# Patient Record
Sex: FEMALE | Race: BLACK OR AFRICAN AMERICAN | Employment: FULL TIME | URBAN - METROPOLITAN AREA
[De-identification: names, ages, dates, MRNs, and addresses within clinical notes are randomized per-mention and may not be internally consistent; named-entity substitution may affect disease eponyms.]

---

## 2017-11-13 ENCOUNTER — OFFICE VISIT (OUTPATIENT)
Dept: INTERNAL MEDICINE CLINIC | Age: 47
End: 2017-11-13

## 2017-11-13 VITALS
RESPIRATION RATE: 18 BRPM | BODY MASS INDEX: 35.01 KG/M2 | HEART RATE: 76 BPM | WEIGHT: 231 LBS | DIASTOLIC BLOOD PRESSURE: 94 MMHG | TEMPERATURE: 96.4 F | SYSTOLIC BLOOD PRESSURE: 145 MMHG | OXYGEN SATURATION: 100 % | HEIGHT: 68 IN

## 2017-11-13 DIAGNOSIS — R05.9 COUGH: ICD-10-CM

## 2017-11-13 DIAGNOSIS — J01.10 ACUTE NON-RECURRENT FRONTAL SINUSITIS: Primary | ICD-10-CM

## 2017-11-13 RX ORDER — ALBUTEROL SULFATE 90 UG/1
1 AEROSOL, METERED RESPIRATORY (INHALATION)
Qty: 1 INHALER | Refills: 0 | Status: SHIPPED | OUTPATIENT
Start: 2017-11-13 | End: 2017-11-20

## 2017-11-13 RX ORDER — BENZONATATE 100 MG/1
100 CAPSULE ORAL
Qty: 21 CAP | Refills: 0 | Status: SHIPPED | OUTPATIENT
Start: 2017-11-13 | End: 2017-11-20

## 2017-11-13 RX ORDER — AMOXICILLIN AND CLAVULANATE POTASSIUM 600; 42.9 MG/5ML; MG/5ML
7.29 POWDER, FOR SUSPENSION ORAL 2 TIMES DAILY
Qty: 150 ML | Refills: 0 | Status: SHIPPED | OUTPATIENT
Start: 2017-11-13 | End: 2017-11-23

## 2017-11-13 NOTE — PATIENT INSTRUCTIONS
Cough: Care Instructions  Your Care Instructions    A cough is your body's response to something that bothers your throat or airways. Many things can cause a cough. You might cough because of a cold or the flu, bronchitis, or asthma. Smoking, postnasal drip, allergies, and stomach acid that backs up into your throat also can cause coughs. A cough is a symptom, not a disease. Most coughs stop when the cause, such as a cold, goes away. You can take a few steps at home to cough less and feel better. Follow-up care is a key part of your treatment and safety. Be sure to make and go to all appointments, and call your doctor if you are having problems. It's also a good idea to know your test results and keep a list of the medicines you take. How can you care for yourself at home? · Drink lots of water and other fluids. This helps thin the mucus and soothes a dry or sore throat. Honey or lemon juice in hot water or tea may ease a dry cough. · Take cough medicine as directed by your doctor. · Prop up your head on pillows to help you breathe and ease a dry cough. · Try cough drops to soothe a dry or sore throat. Cough drops don't stop a cough. Medicine-flavored cough drops are no better than candy-flavored drops or hard candy. · Do not smoke. Avoid secondhand smoke. If you need help quitting, talk to your doctor about stop-smoking programs and medicines. These can increase your chances of quitting for good. When should you call for help? Call 911 anytime you think you may need emergency care. For example, call if:  ? · You have severe trouble breathing. ?Call your doctor now or seek immediate medical care if:  ? · You cough up blood. ? · You have new or worse trouble breathing. ? · You have a new or higher fever. ? · You have a new rash. ? Watch closely for changes in your health, and be sure to contact your doctor if:  ? · You cough more deeply or more often, especially if you notice more mucus or a change in the color of your mucus. ? · You have new symptoms, such as a sore throat, an earache, or sinus pain. ? · You do not get better as expected. Where can you learn more? Go to http://bertha-carmelita.info/. Enter D279 in the search box to learn more about \"Cough: Care Instructions. \"  Current as of: May 12, 2017  Content Version: 11.4  © 4441-8394 [employer]. Care instructions adapted under license by [employer] (which disclaims liability or warranty for this information). If you have questions about a medical condition or this instruction, always ask your healthcare professional. [address] any warranty or liability for your use of this information. Sinusitis: Care Instructions  Your Care Instructions    Sinusitis is an infection of the lining of the sinus cavities in your head. Sinusitis often follows a cold. It causes pain and pressure in your head and face. In most cases, sinusitis gets better on its own in 1 to 2 weeks. But some mild symptoms may last for several weeks. Sometimes antibiotics are needed. Follow-up care is a key part of your treatment and safety. Be sure to make and go to all appointments, and call your doctor if you are having problems. It's also a good idea to know your test results and keep a list of the medicines you take. How can you care for yourself at home? · Take an over-the-counter pain medicine, such as acetaminophen (Tylenol), ibuprofen (Advil, Motrin), or naproxen (Aleve). Read and follow all instructions on the label. · If the doctor prescribed antibiotics, take them as directed. Do not stop taking them just because you feel better. You need to take the full course of antibiotics. · Be careful when taking over-the-counter cold or flu medicines and Tylenol at the same time. Many of these medicines have acetaminophen, which is Tylenol.  Read the labels to make sure that you are not taking more than the recommended dose. Too much acetaminophen (Tylenol) can be harmful. · Breathe warm, moist air from a steamy shower, a hot bath, or a sink filled with hot water. Avoid cold, dry air. Using a humidifier in your home may help. Follow the directions for cleaning the machine. · Use saline (saltwater) nasal washes to help keep your nasal passages open and wash out mucus and bacteria. You can buy saline nose drops at a grocery store or drugstore. Or you can make your own at home by adding 1 teaspoon of salt and 1 teaspoon of baking soda to 2 cups of distilled water. If you make your own, fill a bulb syringe with the solution, insert the tip into your nostril, and squeeze gently. Meliton Maizes your nose. · Put a hot, wet towel or a warm gel pack on your face 3 or 4 times a day for 5 to 10 minutes each time. · Try a decongestant nasal spray like oxymetazoline (Afrin). Do not use it for more than 3 days in a row. Using it for more than 3 days can make your congestion worse. When should you call for help? Call your doctor now or seek immediate medical care if:  ? · You have new or worse swelling or redness in your face or around your eyes. ? · You have a new or higher fever. ? Watch closely for changes in your health, and be sure to contact your doctor if:  ? · You have new or worse facial pain. ? · The mucus from your nose becomes thicker (like pus) or has new blood in it. ? · You are not getting better as expected. Where can you learn more? Go to http://bertha-carmelita.info/. Enter I991 in the search box to learn more about \"Sinusitis: Care Instructions. \"  Current as of: May 12, 2017  Content Version: 11.4  © 8363-9717 [employer]. Care instructions adapted under license by [employer] (which disclaims liability or warranty for this information).  If you have questions about a medical condition or this instruction, always ask your healthcare professional. [employer] disclaims any warranty or liability for your use of this information.

## 2017-11-13 NOTE — LETTER
NOTIFICATION RETURN TO WORK / SCHOOL 
 
11/13/2017 9:48 AM 
 
Ms. Gage Lindsey [facility] [phone] To Whom It May Concern: 
 
Gage Lindsey is currently under the care of Juan José Navarro. She will return to work/school on: 11/16/2017. If there are questions or concerns please have the patient contact our office. Sincerely, Thomas Melendrez NP

## 2017-11-13 NOTE — PROGRESS NOTES
ROOM # 8    Rosalinda Washington presents today for   Chief Complaint   Patient presents with    Cold Symptoms     X 2 WEEKS     Vomiting       Rosalinda Washington preferred language for health care discussion is english/other. Is someone accompanying this pt?no    Is the patient using any DME equipment during OV? no    Depression Screening:  PHQ over the last two weeks 11/18/2014   Little interest or pleasure in doing things Not at all   Feeling down, depressed or hopeless Not at all   Total Score PHQ 2 0       Learning Assessment:  Learning Assessment 11/13/2017   PRIMARY LEARNER Patient   HIGHEST LEVEL OF EDUCATION - PRIMARY LEARNER  2 YEARS OF COLLEGE   BARRIERS PRIMARY LEARNER NONE   CO-LEARNER CAREGIVER No   PRIMARY LANGUAGE ENGLISH   LEARNER PREFERENCE PRIMARY READING     DEMONSTRATION     VIDEOS   ANSWERED BY PATIENT   RELATIONSHIP SELF       Abuse Screening:  Abuse Screening Questionnaire 11/13/2017   Do you ever feel afraid of your partner? N   Are you in a relationship with someone who physically or mentally threatens you? N   Is it safe for you to go home? Y       Fall Risk  n/i    Health Maintenance reviewed and discussed per provider. Yes    Rosalinda Washington is due for flu injection . Please order/place referral if appropriate. Advance Directive:  1. Do you have an advance directive in place? Patient Reply: no    2. If not, would you like material regarding how to put one in place? Patient Reply: no    Coordination of Care:  1. Have you been to the ER, urgent care clinic since your last visit? Hospitalized since your last visit? no    2. Have you seen or consulted any other health care providers outside of the [address] [place] [address] since your last visit? Include any pap smears or colon screening.  no

## 2017-11-13 NOTE — MR AVS SNAPSHOT
Visit Information Date & Time Provider Department Dept. Phone Encounter #  
 11/13/2017  9:30 AM Kate Coelho NP [facility] [phone] [id number] Upcoming Health Maintenance Date Due DTaP/Tdap/Td series (1 - Tdap) 11/21/1990 PAP AKA CERVICAL CYTOLOGY 11/21/1990 Allergies as of 11/13/2017  Review Complete On: 11/13/2017 By: Faye Bustillo No Known Allergies Current Immunizations  Never Reviewed No immunizations on file. Not reviewed this visit You Were Diagnosed With   
  
 Codes Comments Acute non-recurrent frontal sinusitis    -  Primary ICD-10-CM: J01.10 ICD-9-CM: 989.5 Cough     ICD-10-CM: R05 ICD-9-CM: 066. 2 Vitals BP Pulse Temp Resp Height(growth percentile) Weight(growth percentile) (!) 145/94 (BP 1 Location: Right arm, BP Patient Position: Sitting) 76 96.4 °F (35.8 °C) (Oral) 18 5' 8\" (1.727 m) 231 lb (104.8 kg) SpO2 BMI OB Status Smoking Status 100% 35.12 kg/m2 Having regular periods Never Smoker Vitals History BMI and BSA Data Body Mass Index Body Surface Area  
 35.12 kg/m 2 2.24 m 2 Preferred Pharmacy Pharmacy Name Phone [id number] [address] [phone] Your Updated Medication List  
  
   
This list is accurate as of: 11/13/17  9:49 AM.  Always use your most recent med list.  
  
  
  
  
 albuterol 90 mcg/actuation inhaler Commonly known as:  PROVENTIL HFA, VENTOLIN HFA, PROAIR HFA Take 1 Puff by inhalation every six (6) hours as needed for Wheezing for up to 7 days. amLODIPine-Olmesartan 10-40 mg Tab Commonly known as:  KAMALA Take 10-40 mg by mouth daily. Indications: Hypertension  
  
 amoxicillin-clavulanate 600-42.9 mg/5 mL suspension Commonly known as:  AUGMENTIN Take 7.5 mL by mouth two (2) times a day for 10 days. benzonatate 100 mg capsule Commonly known as:  TESSALON  
 Take 1 Cap by mouth three (3) times daily as needed for Cough for up to 7 days. hydroCHLOROthiazide 25 mg tablet Commonly known as:  HYDRODIURIL Take 1 tablet by mouth daily. oxyCODONE-acetaminophen 5-325 mg per tablet Commonly known as:  PERCOCET Take 1 Tab by Mouth Every 6 Hours As Needed. Do not exceed 4000 mg of acetaminophen per day. Prescriptions Sent to Pharmacy Refills  
 amoxicillin-clavulanate (AUGMENTIN) 600-42.9 mg/5 mL suspension 0 Sig: Take 7.5 mL by mouth two (2) times a day for 10 days. Class: Normal  
 Pharmacy: [address] [place] [address] Ph #: [phone] Route: Oral  
 benzonatate (TESSALON) 100 mg capsule 0 Sig: Take 1 Cap by mouth three (3) times daily as needed for Cough for up to 7 days. Class: Normal  
 Pharmacy: [address] [place] [address] Ph #: [phone] Route: Oral  
 albuterol (PROVENTIL HFA, VENTOLIN HFA, PROAIR HFA) 90 mcg/actuation inhaler 0 Sig: Take 1 Puff by inhalation every six (6) hours as needed for Wheezing for up to 7 days. Class: Normal  
 Pharmacy: [address] [place] [address] Ph #: [phone] Route: Inhalation Patient Instructions Cough: Care Instructions Your Care Instructions A cough is your body's response to something that bothers your throat or airways. Many things can cause a cough. You might cough because of a cold or the flu, bronchitis, or asthma. Smoking, postnasal drip, allergies, and stomach acid that backs up into your throat also can cause coughs. A cough is a symptom, not a disease. Most coughs stop when the cause, such as a cold, goes away. You can take a few steps at home to cough less and feel better. Follow-up care is a key part of your treatment and safety.  Be sure to make and go to all appointments, and call your doctor if you are having problems. It's also a good idea to know your test results and keep a list of the medicines you take. How can you care for yourself at home? · Drink lots of water and other fluids. This helps thin the mucus and soothes a dry or sore throat. Honey or lemon juice in hot water or tea may ease a dry cough. · Take cough medicine as directed by your doctor. · Prop up your head on pillows to help you breathe and ease a dry cough. · Try cough drops to soothe a dry or sore throat. Cough drops don't stop a cough. Medicine-flavored cough drops are no better than candy-flavored drops or hard candy. · Do not smoke. Avoid secondhand smoke. If you need help quitting, talk to your doctor about stop-smoking programs and medicines. These can increase your chances of quitting for good. When should you call for help? Call 911 anytime you think you may need emergency care. For example, call if: 
? · You have severe trouble breathing. ?Call your doctor now or seek immediate medical care if: 
? · You cough up blood. ? · You have new or worse trouble breathing. ? · You have a new or higher fever. ? · You have a new rash. ? Watch closely for changes in your health, and be sure to contact your doctor if: 
? · You cough more deeply or more often, especially if you notice more mucus or a change in the color of your mucus. ? · You have new symptoms, such as a sore throat, an earache, or sinus pain. ? · You do not get better as expected. Where can you learn more? Go to http://bertha-carmelita.info/. Enter D279 in the search box to learn more about \"Cough: Care Instructions. \" Current as of: May 12, 2017 Content Version: 11.4 © 1267-9709 [employer]. Care instructions adapted under license by [email or web address] (which disclaims liability or warranty for this information).  If you have questions about a medical condition or this instruction, always ask your healthcare professional. [place] [address] any warranty or liability for your use of this information. Sinusitis: Care Instructions Your Care Instructions Sinusitis is an infection of the lining of the sinus cavities in your head. Sinusitis often follows a cold. It causes pain and pressure in your head and face. In most cases, sinusitis gets better on its own in 1 to 2 weeks. But some mild symptoms may last for several weeks. Sometimes antibiotics are needed. Follow-up care is a key part of your treatment and safety. Be sure to make and go to all appointments, and call your doctor if you are having problems. It's also a good idea to know your test results and keep a list of the medicines you take. How can you care for yourself at home? · Take an over-the-counter pain medicine, such as acetaminophen (Tylenol), ibuprofen (Advil, Motrin), or naproxen (Aleve). Read and follow all instructions on the label. · If the doctor prescribed antibiotics, take them as directed. Do not stop taking them just because you feel better. You need to take the full course of antibiotics. · Be careful when taking over-the-counter cold or flu medicines and Tylenol at the same time. Many of these medicines have acetaminophen, which is Tylenol. Read the labels to make sure that you are not taking more than the recommended dose. Too much acetaminophen (Tylenol) can be harmful. · Breathe warm, moist air from a steamy shower, a hot bath, or a sink filled with hot water. Avoid cold, dry air. Using a humidifier in your home may help. Follow the directions for cleaning the machine. · Use saline (saltwater) nasal washes to help keep your nasal passages open and wash out mucus and bacteria. You can buy saline nose drops at a grocery store or drugstore.  Or you can make your own at home by adding 1 teaspoon of salt and 1 teaspoon of baking soda to 2 cups of distilled water. If you make your own, fill a bulb syringe with the solution, insert the tip into your nostril, and squeeze gently. Afia Bunting your nose. · Put a hot, wet towel or a warm gel pack on your face 3 or 4 times a day for 5 to 10 minutes each time. · Try a decongestant nasal spray like oxymetazoline (Afrin). Do not use it for more than 3 days in a row. Using it for more than 3 days can make your congestion worse. When should you call for help? Call your doctor now or seek immediate medical care if: 
? · You have new or worse swelling or redness in your face or around your eyes. ? · You have a new or higher fever. ? Watch closely for changes in your health, and be sure to contact your doctor if: 
? · You have new or worse facial pain. ? · The mucus from your nose becomes thicker (like pus) or has new blood in it. ? · You are not getting better as expected. Where can you learn more? Go to http://bertha-carmelita.info/. Enter H051 in the search box to learn more about \"Sinusitis: Care Instructions. \" Current as of: May 12, 2017 Content Version: 11.4 © 0120-7355 amaysim. Care instructions adapted under license by [employer] (which disclaims liability or warranty for this information). If you have questions about a medical condition or this instruction, always ask your healthcare professional. [place] [address] any warranty or liability for your use of this information. Introducing [facility]! Melissa Hudson introduces "[employer] patient portal. Now you can access parts of your medical record, email your doctor's office, and request medication refills online. 1. In your internet browser, go to [email or web address] [email or web address]. [email or web address]/[employer] 2. Click on the First Time User? Click Here link in the Sign In box. You will see the New Member Sign Up page. 3. Enter your "[employer] Access Code exactly as it appears below.  You will not need to use this code after youve completed the sign-up process. If you do not sign up before the expiration date, you must request a new code. · Nepris Access Code: [email or web address] Expires: 2/11/2018  9:49 AM 
 
4. Enter the last four digits of your Social Security Number (xxxx) and Date of Birth (mm/dd/yyyy) as indicated and click Submit. You will be taken to the next sign-up page. 5. Create a Nepris ID. This will be your Nepris login ID and cannot be changed, so think of one that is secure and easy to remember. 6. Create a Nepris password. You can change your password at any time. 7. Enter your Password Reset Question and Answer. This can be used at a later time if you forget your password. 8. Enter your e-mail address. You will receive e-mail notification when new information is available in [address]. 9. Click Sign Up. You can now view and download portions of your medical record. 10. Click the Download Summary menu link to download a portable copy of your medical information. If you have questions, please visit the Frequently Asked Questions section of the Nepris website. Remember, Nepris is NOT to be used for urgent needs. For medical emergencies, dial 911. Now available from your iPhone and Android! Please provide this summary of care documentation to your next provider. If you have any questions after today's visit, please call [phone].

## 2017-11-13 NOTE — PROGRESS NOTES
HISTORY OF PRESENT ILLNESS  Kirk Bartholomew is a 51 y.o. female. Cold Symptoms   The history is provided by the patient. This is a new problem. The current episode started more than 2 days ago. The problem occurs constantly. The problem has been gradually worsening. The cough is productive of sputum. Patient reports a subjective fever - was not measured. The fever has been present for 3 - 4 days. Associated symptoms include chest pain, chills, headaches, rhinorrhea, sore throat, shortness of breath and wheezing. Pertinent negatives include no weight loss, no eye redness, no ear pain, no myalgias, no nausea, no vomiting and no confusion. She has tried cough syrup for the symptoms. The treatment provided no relief. She is not a smoker. Her past medical history does not include bronchitis or pneumonia. Review of Systems   Constitutional: Positive for chills, fever and malaise/fatigue. Negative for weight loss. HENT: Positive for congestion, rhinorrhea, sinus pain and sore throat. Negative for ear pain. Eyes: Negative for blurred vision, double vision, photophobia and redness. Respiratory: Positive for cough, sputum production, shortness of breath and wheezing. Cardiovascular: Positive for chest pain. Negative for palpitations and orthopnea. Gastrointestinal: Negative for abdominal pain, heartburn, nausea and vomiting. Genitourinary: Negative for dysuria, frequency and urgency. Musculoskeletal: Negative for myalgias. Skin: Negative for rash. Neurological: Positive for headaches. Negative for dizziness. Endo/Heme/Allergies: Negative for environmental allergies and polydipsia. Psychiatric/Behavioral: Negative for confusion and depression. The patient is not nervous/anxious. Physical Exam   Constitutional: She is oriented to person, place, and time. She appears well-developed and well-nourished. No distress. HENT:   Head: Normocephalic and atraumatic.    Right Ear: External ear normal. Tympanic membrane is erythematous. Left Ear: External ear normal. Tympanic membrane is erythematous. Nose: No mucosal edema or rhinorrhea. Mouth/Throat: Mucous membranes are normal. Oropharyngeal exudate, posterior oropharyngeal edema and posterior oropharyngeal erythema present. No tonsillar abscesses. Eyes: EOM are normal. Pupils are equal, round, and reactive to light. Neck: Normal range of motion. Neck supple. Cardiovascular: Regular rhythm. Pulmonary/Chest: Breath sounds normal. She has no wheezes. Lymphadenopathy:     She has no cervical adenopathy. Neurological: She is alert and oriented to person, place, and time. No cranial nerve deficit. Skin: Skin is warm and dry. She is not diaphoretic. No erythema. Psychiatric: She has a normal mood and affect. Nursing note and vitals reviewed. ASSESSMENT and PLAN    ICD-10-CM ICD-9-CM    1. Acute non-recurrent frontal sinusitis J01.10 461.1 amoxicillin-clavulanate (AUGMENTIN) 600-42.9 mg/5 mL suspension   2. Cough R05 786.2 amoxicillin-clavulanate (AUGMENTIN) 600-42.9 mg/5 mL suspension      benzonatate (TESSALON) 100 mg capsule      albuterol (PROVENTIL HFA, VENTOLIN HFA, PROAIR HFA) 90 mcg/actuation inhaler   Patient advised to take medication as prescribed. Take rest and plenty of fluids. Alternate tylenol and ibuprofen for fever. Return to clinic if condition worsens in next 72 hours. Patient verbalized the understanding.

## 2018-01-29 ENCOUNTER — OFFICE VISIT (OUTPATIENT)
Dept: ORTHOPEDIC SURGERY | Age: 48
End: 2018-01-29

## 2018-01-29 VITALS
HEART RATE: 84 BPM | BODY MASS INDEX: 34.86 KG/M2 | OXYGEN SATURATION: 100 % | RESPIRATION RATE: 16 BRPM | SYSTOLIC BLOOD PRESSURE: 159 MMHG | WEIGHT: 230 LBS | HEIGHT: 68 IN | DIASTOLIC BLOOD PRESSURE: 99 MMHG

## 2018-01-29 DIAGNOSIS — M76.821 TIBIALIS TENDINITIS OF BOTH LOWER EXTREMITIES: Primary | ICD-10-CM

## 2018-01-29 DIAGNOSIS — M77.50 FOOT TENDINITIS: ICD-10-CM

## 2018-01-29 DIAGNOSIS — M76.822 TIBIALIS TENDINITIS OF BOTH LOWER EXTREMITIES: Primary | ICD-10-CM

## 2018-01-29 DIAGNOSIS — M77.50 ANKLE TENDINITIS: ICD-10-CM

## 2018-01-29 RX ORDER — MELOXICAM 15 MG/1
TABLET ORAL
Qty: 90 TAB | Refills: 0 | Status: SHIPPED | OUTPATIENT
Start: 2018-01-29 | End: 2018-02-25

## 2018-01-29 NOTE — MR AVS SNAPSHOT
[address] [address] [address], [address] [facility] [id number] 
[phone] Patient: Britney Ford MRN: [id number] :1970 Visit Information Date & Time Provider Department Dept. Phone Encounter #  
 2018  8:20 AM Luis M Fatima, [address] [facility] [phone] [id number] Follow-up Instructions Return in about 4 weeks (around 2018) for ankle/foot tendinitis. Upcoming Health Maintenance Date Due DTaP/Tdap/Td series (1 - Tdap) 1990 PAP AKA CERVICAL CYTOLOGY 1990 Allergies as of 2018  Review Complete On: 2018 By: Luis M Fatima,  No Known Allergies Current Immunizations  Never Reviewed No immunizations on file. Not reviewed this visit You Were Diagnosed With   
  
 Codes Comments Anterior tibial tendonitis, unspecified laterality    -  Primary ICD-10-CM: P05.572 ICD-9-CM: 726.72 Tibialis posterior tendinitis, unspecified laterality     ICD-10-CM: S85.312 ICD-9-CM: 726.72 Foot tendinitis     ICD-10-CM: M77.50 ICD-9-CM: 727.06 Ankle tendinitis     ICD-10-CM: M77.50 ICD-9-CM: 727.06 Vitals BP Pulse Resp Height(growth percentile) Weight(growth percentile) SpO2  
 (!) 159/99 (BP 1 Location: Right arm, BP Patient Position: Sitting) 84 16 5' 8\" (1.727 m) 230 lb (104.3 kg) 100% BMI OB Status Smoking Status 34.97 kg/m2 Having regular periods Never Smoker BMI and BSA Data Body Mass Index Body Surface Area 34.97 kg/m 2 2.24 m 2 Preferred Pharmacy Pharmacy Name Phone [phone], [address] [place] [address] [phone] Your Updated Medication List  
  
   
This list is accurate as of: 18  8:46 AM.  Always use your most recent med list. amLODIPine-Olmesartan 10-40 mg Tab Commonly known as:  KAMALA  
 Take 10-40 mg by mouth daily. Indications: Hypertension  
  
 hydroCHLOROthiazide 25 mg tablet Commonly known as:  HYDRODIURIL Take 1 tablet by mouth daily. meloxicam 15 mg tablet Commonly known as:  MOBIC Take 1 tab daily as needed pain with food. oxyCODONE-acetaminophen 5-325 mg per tablet Commonly known as:  PERCOCET Take 1 Tab by Mouth Every 6 Hours As Needed. Do not exceed 4000 mg of acetaminophen per day. Prescriptions Sent to Pharmacy Refills  
 meloxicam (MOBIC) 15 mg tablet 0 Sig: Take 1 tab daily as needed pain with food. Class: Normal  
 Pharmacy: [address] [facility], [address] [facility]  #: [phone] We Performed the Following REFERRAL TO PHYSICAL THERAPY [AZT03 Custom] Comments:  
 2-3/week for 4 weeks. [phone] Follow-up Instructions Return in about 4 weeks (around 2/26/2018) for ankle/foot tendinitis. Referral Information Referral ID Referred By Referred To  
  
 [id number] [address], [address] [place] [address] Phone: [phone] Visits Status Start Date End Date 1 New Request 1/29/18 1/29/19 If your referral has a status of pending review or denied, additional information will be sent to support the outcome of this decision. Patient Instructions   
Michelle Search [employer] for my channel: 
 
Dr. Juwan Rosarioints Introducing [facility]! [facility] introduces [employer] patient portal. Now you can access parts of your medical record, email your doctor's office, and request medication refills online. 1. In your internet browser, go to [email or web address] 2. Click on the First Time User? Click Here link in the Sign In box. You will see the New Member Sign Up page. 3. Enter your Zipzoom Access Code exactly as it appears below. You will not need to use this code after youve completed the sign-up process. If you do not sign up before the expiration date, you must request a new code. · Zipzoom Access Code: [email or web address] Expires: 2/11/2018  9:49 AM 
 
4. Enter the last four digits of your Social Security Number (xxxx) and Date of Birth (mm/dd/yyyy) as indicated and click Submit. You will be taken to the next sign-up page. 5. Create a Zipzoom ID. This will be your Zipzoom login ID and cannot be changed, so think of one that is secure and easy to remember. 6. Create a Zipzoom password. You can change your password at any time. 7. Enter your Password Reset Question and Answer. This can be used at a later time if you forget your password. 8. Enter your e-mail address. You will receive e-mail notification when new information is available in [address] 9. Click Sign Up. You can now view and download portions of your medical record. 10. Click the Download Summary menu link to download a portable copy of your medical information. If you have questions, please visit the Frequently Asked Questions section of the Zipzoom website. Remember, Zipzoom is NOT to be used for urgent needs. For medical emergencies, dial 911. Now available from your iPhone and Android! Please provide this summary of care documentation to your next provider. If you have any questions after today's visit, please call [phone].

## 2018-01-29 NOTE — PROGRESS NOTES
HISTORY OF PRESENT ILLNESS    Steve Cartwright is a 49y.o. year old female comes in today as new patient for: foot pain bilateral    Patients symptoms have been present for 1 years. Pain level 8/10 bilateral feet, It has worsened with walking. Patient has tried:  massgae and different shoes w/o benefit. It is described as pain great toe and medial foot worse with waling and no injury. Will swell sometimes but not today. Social History     Social History    Marital status:      Spouse name: N/A    Number of children: N/A    Years of education: N/A     Social History Main Topics    Smoking status: Never Smoker    Smokeless tobacco: Never Used    Alcohol use No    Drug use: No    Sexual activity: Yes     Partners: Male     Other Topics Concern    None     Social History Narrative     Current Outpatient Prescriptions   Medication Sig Dispense Refill    amLODIPine-Olmesartan (KAMALA) 10-40 mg tab Take 10-40 mg by mouth daily. Indications: Hypertension 90 Tab 3    oxyCODONE-acetaminophen (PERCOCET) 5-325 mg per tablet Take 1 Tab by Mouth Every 6 Hours As Needed. Do not exceed 4000 mg of acetaminophen per day.  hydrochlorothiazide (HYDRODIURIL) 25 mg tablet Take 1 tablet by mouth daily. (Patient not taking: Reported on 1/29/2018) 60 tablet 5     History reviewed. No pertinent past medical history. Family History   Problem Relation Age of Onset    Dementia Father          ROS  :All other systems reviewed and negative aside from that written in the HPI. Objective:  Visit Vitals    BP (!) 159/99 (BP 1 Location: Right arm, BP Patient Position: Sitting)  Comment: hasn't taken am dose of BP med    Pulse 84    Resp 16    Ht 5' 8\" (1.727 m)    Wt 230 lb (104.3 kg)    SpO2 100%    BMI 34.97 kg/m2     GEN: Appears stated age in NAD. HEAD:  Normocephalic, atraumatic. NEURO:  Sensation intact light touch B/L lower extremities.   MS:  Strength normal throughout upper and lower extremities bilateral . bilateral  ankle/foot:  Positive tenderness at tib ant/post/EHL. Negative for tenderness at malleoli, base 5th. Anterior drawer test negative. Talar tilt negative. Kleiger test negative. Syndesmosis squeeze negative. negative fibular head tenderness. Fibular head motion normal. Gait normal.  no clubbing/cyanosis. ROM normal.  EXT: no clubbing/cyanosis. no edema. SKIN: Warm/dry without rash. HEENT: Conjunctiva/lids WNL. External canals/nares WNL. Tongue midline. PERRL, EOMI. Hearing intact. NECK: Trachea midline. Supple, Full ROM. No thyromegaly. CARDIAC: No edema. LUNGS: Normal effort. ABD: Soft, no masses. No HSM. PSYCH: A+O x3. Appropriate judgment and insight. Assessment/Plan:   Encounter Diagnoses   Name Primary?  Tibialis tendinitis of both lower extremities Yes    Foot tendinitis     Ankle tendinitis      Orders Placed This Encounter    REFERRAL TO PHYSICAL THERAPY     Referral Priority:   Routine     Referral Type:   PT/OT/ST     Referral Reason:   Specialty Services Required    meloxicam (MOBIC) 15 mg tablet     Sig: Take 1 tab daily as needed pain with food. Dispense:  90 Tab     Refill:  0     Patient verbalizes understanding of evaluation and plan. Will start HEP an PT and use mobic and RTC 4 weeks.

## 2018-02-13 ENCOUNTER — HOSPITAL ENCOUNTER (OUTPATIENT)
Dept: PHYSICAL THERAPY | Age: 48
Discharge: HOME | End: 2018-02-13
Payer: COMMERCIAL

## 2018-02-13 PROCEDURE — 97162 PT EVAL MOD COMPLEX 30 MIN: CPT

## 2018-02-13 PROCEDURE — 97110 THERAPEUTIC EXERCISES: CPT

## 2018-02-13 NOTE — PROGRESS NOTES
[facility] PHYSICAL THERAPY  [address] [place] [address] [address], [address] - Phone: [phone]  Fax: [phone] / [address] [address] PHYSICAL THERAPY SERVICES  Patient Name: Owen Blake : 1970   Medical   Diagnosis: Bilateral foot pain [M79.671, M79.672] Treatment Diagnosis: Bilateral foot pain   Onset Date: Chronic (2 years)     Referral Source: Destini Arndt DO Start of Care [facility]): 2018   Prior Hospitalization: See medical history Provider #: [id number]   Prior Level of Function: Independent with ambulation, walking >3x/wk   Comorbidities: Anxiety, Arthritis, LBP, GI disease, HTN, removal of pituitary tumor   Medications: Verified on Patient Summary List   The Plan of Care and following information is based on the information from the initial evaluation.   ===========================================================================================  Assessment / key information:  Pt is a 49year old female who presents to PT today with bilateral foot pain consistent with flexor hallucis longus tendinopathy with sesmoiditis, and OA of the first MTP joint based on subjective and objective findings. Pt reports that her pain is insidious in nature and began 2 years ago. She states that she has a history of heel spurs that no longer bother her, indicating potential for arthritic changes of the first MTP joints bilaterally. She reports significant discomfort while walking in any style of footwear. Pt is TTP of bilateral hallux sesmoids, 1st MTP joint line, and both dorsal and plantar surfaces of the head of the first MTP. Foot posture is pes planus, and she demonstrates increased pronation bilaterally with bilateral IR of tibia and femur during gait assessment resulting in genu valgum.  She will benefit from OP PT to address the following impairments: decreased ankle and great toe A/PROM, decreased LE strength, decreased LE flexibility, decreased foot intrinsic strength, and functional limitations. Thank you for this referral.    Objective measures: Ankle/Foot A/PROM  R   L   Ankle DF:  Lack 10/lack 2 deg Lacking 3/5 deg   Ankle PF:  10-60   3-60   Ankle IV:  0-21/27  0-20/25   Ankle EV:  0-25/27  0-21/25   GT Ext:  0-55/60 p!  0-54/60 p! Strength:   R  L   Hip abd   3+/5  3-/5   Hip ext   3-/5  3-/5   Ankle DF   5/5  5/5   Ankle PF   Defer  Defer   Ankle IV   5/5  4/5   Ankle EV   5/5  5/5   Great Toe DF   3+/5 p!  3+/5 p! Great Toe PF  3+/5 p!  3+/5 p!   Special Tests:   Midfoot mobility: WNL   First ray position/mobility: Plantarflexed and hypomobile bilaterally    Pes planus    ==========================================================================================  Eval Complexity: History: HIGH Complexity :3+ comorbidities / personal factors will impact the outcome/ POC Exam:HIGH Complexity : 4+ Standardized tests and measures addressing body structure, function, activity limitation and / or participation in recreation  Presentation: MEDIUM Complexity : Evolving with changing characteristics  Clinical Decision Making:MEDIUM Complexity : FOTO score of 26-74Overall Complexity:MEDIUM    Problem List: pain affecting function, decrease ROM, decrease strength, impaired gait/ balance, decrease ADL/ functional abilitiies, decrease activity tolerance, decrease flexibility/ joint mobility and decrease transfer abilities   Treatment Plan may include any combination of the following: Therapeutic exercise, Therapeutic activities, Neuromuscular re-education, Physical agent/modality, Gait/balance training, Manual therapy, Aquatic therapy, Patient education, Self Care training, Functional mobility training, Home safety training and Stair training  Patient / Family readiness to learn indicated by: asking questions, trying to perform skills and interest  Persons(s) to be included in education: patient (P)  Barriers to Learning/Limitations: None  Measures taken: A/PROM ankle/foot, MMT LE, foot mobility assessment, abnormalities of gait. Patient Goal (s): \"I want to be able to walk without pain. \"   Patient self reported health status: good  Rehabilitation Potential: good   Short Term Goals: To be accomplished in  2  weeks:  1. Pt will be compliant with HEP for symptom management at home. 2. Pt will report reduced pain level to at least 5/10 in order to facilitate full participation in physical therapy activities. 3. Pt will improve bilateral ankle DF PROM to at least 10 degrees in order to facilitate normal gait mechanics.  Long Term Goals: To be accomplished in  4  weeks:  1. Pt will be independent with HEP at D/C for self management. 2. Pt will demonstrate bilateral great toe extension PROM to at least 80 degrees in order to normalize push off.   3. Pt will improve bilateral hip abd MMT to at least 4/5 in order to facilitate improved efficiency with ambulation. 4. Pt will increase FS FOTO Score to >/= 63 to indicate a significant decrease in functional disability. Frequency / Duration:   Patient to be seen  1-2  times per week for 4  weeks:  Patient / Caregiver education and instruction: self care, activity modification and exercises  G-Codes (GP): NA  Therapist Signature: Sushant Messina PT Date: 4/82/7115   Certification Period: 2/13/18 to 5/12/18 Time: 5:27 PM   ===========================================================================================  I certify that the above Physical Therapy Services are being furnished while the patient is under my care. I agree with the treatment plan and certify that this therapy is necessary. Physician Signature:        Date:       Time:     Please sign and return to [employer] or you may fax the signed copy to [phone]. Thank you.

## 2018-02-13 NOTE — PROGRESS NOTES
PHYSICAL THERAPY - DAILY TREATMENT NOTE    Patient Name: Jozef Estrada        Date: 2018  : 1970   YES Patient  Verified  Visit #:   1     Insurance: Payor: Mei Delcid / Plan: Winnie Fajardo PPO / Product Type: PPO /      In time: 4:05 Out time: 5:00   Total Treatment Time: 55 min     Medicare Time Tracking (below)   Total Timed Codes (min):  NA 1:1 Treatment Time:  NA     TREATMENT AREA =  Bilateral Foot Pain    SUBJECTIVE    Pain Level (on 0 to 10 scale):  8  / 10   Medication Changes/New allergies or changes in medical history, any new surgeries or procedures? NO    If yes, update Summary List   Subjective Functional Status/Changes:  []  No changes reported     See IE          OBJECTIVE    15 min Therapeutic Exercise:  [x]  See flow sheet   Rationale:      increase ROM, increase strength, improve coordination, improve balance and increase proprioception to improve the patients ability to participate in functional ADL's and negt within the community with ease. min Patient Education:  YES  Reviewed HEP   []  Progressed/Changed HEP based on:   Issued initial HEP     Other Objective/Functional Measures:    See IE. Post Treatment Pain Level (on 0 to 10) scale:   8  / 10     ASSESSMENT    Assessment/Changes in Function:     Justification for Eval Code Complexity: Moderate  Patient History (low 0, mod 1-2, high 3-4): Anxiety, Arthritis, BMI, Cholecystectomy, chronicity of symptoms.  High  Examination (low 1-2, mod 3+, high 4+): High   Clinical Presentation (low: stable/uncomplicated; mod: evolving; high: unstable/unpredictable): Evolving, Moderate  Clinical Decision Making (low , mod 26-74, high 1-25): FOTO: 54         []  See Progress Note/Recertification   Patient will continue to benefit from skilled PT services to analyze,, cue,, progress,, modify,, demonstrate,, instruct, and address, movement patterns,, therapeutic interventions,, postural abnormalities,, soft tissue restrictions,, ROM,, strength,, functional mobility,, body mechanics/ergonomics, and home and community integration, to attain remaining goals.    Progress toward goals / Updated goals:    See POC     PLAN    []  Upgrade activities as tolerated YES Continue plan of care   []  Discharge due to :    []  Other:      Therapist: PATRICIA Junior, SPT    Date: 2/13/2018 Time: 5:27 PM   Future Appointments  Date Time Provider Ely Coy   2/19/2018 10:00 AM Yamile Short, PT [facility]   2/21/2018 4:00 PM Alex Black PTA [facility]

## 2018-02-19 ENCOUNTER — HOSPITAL ENCOUNTER (OUTPATIENT)
Dept: PHYSICAL THERAPY | Age: 48
Discharge: HOME | End: 2018-02-19
Payer: COMMERCIAL

## 2018-02-19 PROCEDURE — 97110 THERAPEUTIC EXERCISES: CPT

## 2018-02-19 NOTE — PROGRESS NOTES
PHYSICAL THERAPY - DAILY TREATMENT NOTE    Patient Name: Nidhi Barajas        Date: 2018  : 1970   YES Patient  Verified  Visit #:   2     Insurance: Payor: Kevyn Gr / Plan: Janey Lam PPO / Product Type: PPO /      In time: 3:30 Out time: 4:10   Total Treatment Time: 40     Medicare Time Tracking (below)   Total Timed Codes (min):  NA 1:1 Treatment Time:  NA     TREATMENT AREA =  B feet    SUBJECTIVE    Pain Level (on 0 to 10 scale):  0  / 10   Medication Changes/New allergies or changes in medical history, any new surgeries or procedures? NO    If yes, update Summary List   Subjective Functional Status/Changes:  []  No changes reported     \"Today is a good day. I like the calf str exercise\"           OBJECTIVE    40 min Therapeutic Exercise:  [x]  See flow sheet   Rationale:      increase ROM and increase strength to improve the patients ability to amb and perform ADLs with with increased ease       min Patient Education:  YES  Reviewed HEP   []  Progressed/Changed HEP based on:   Cont HEP     Other Objective/Functional Measures: Added hip girdle strengthening with noted fatigue throughout   Poor ROM with seated HR, instructed pt to achieve \"flat front of ankle\" within pain free range     Post Treatment Pain Level (on 0 to 10) scale:   0  / 10     ASSESSMENT    Assessment/Changes in Function:     TOlerated session well, noted reports of calf tightness juan      []  See Progress Note/Recertification   Patient will continue to benefit from skilled PT services to analyze,, cue,, progress,, modify,, demonstrate,, instruct, and address, movement patterns,, therapeutic interventions,, postural abnormalities,, soft tissue restrictions,, ROM,, strength,, functional mobility,, body mechanics/ergonomics, and home and community integration, to attain remaining goals.    Progress toward goals / Updated goals:    Reports compliance with HEP     PLAN    []  Upgrade activities as tolerated YES Continue plan of care   []  Discharge due to :    []  Other:      Therapist: Izaiah Cosme DPT    Date: 2/19/2018 Time: 3:47 PM   Future Appointments  Date Time Provider Ely Coy   2/21/2018 4:00 PM Darius [facility]

## 2018-02-21 ENCOUNTER — HOSPITAL ENCOUNTER (OUTPATIENT)
Dept: PHYSICAL THERAPY | Age: 48
Discharge: HOME | End: 2018-02-21
Payer: COMMERCIAL

## 2018-02-21 PROCEDURE — 97110 THERAPEUTIC EXERCISES: CPT

## 2018-02-21 NOTE — PROGRESS NOTES
PHYSICAL THERAPY - DAILY TREATMENT NOTE    Patient Name: Claudette Guerra        Date: 2018  : 1970   yes Patient  Verified  Visit #:   3    Insurance: Payor: Scott Chapin / Plan: Susy Begun PPO / Product Type: PPO /      In time: 400 Out time: 455   Total Treatment Time: 55     TREATMENT AREA =  Bilateral foot pain [M79.671, M79.672]    SUBJECTIVE  Pain Level (on 0 to 10 scale):  8  / 10   Medication Changes/New allergies or changes in medical history, any new surgeries or procedures?    no  If yes, update Summary List   Subjective Functional Status/Changes:  []  No changes reported     \"I am hurting today, some days are better than others.  I been doing my HEP and it helps''          OBJECTIVE  Modalities Rationale:     decrease inflammation and decrease pain to improve patient's ability to  perform ADLs/prolong stding and amb/stairs with ease    min [] Estim, type/location:                                      []  att     []  unatt     []  w/US     []  w/ice    []  w/heat    min []  Mechanical Traction: type/lbs                   []  pro   []  sup   []  int   []  cont    []  before manual    []  after manual    min []  Ultrasound, settings/location:      min []  Iontophoresis w/ dexamethasone, location:                                               []  take home patch       []  in clinic   10 min [x]  Ice     []  Heat    location/position: Supine with wedge under B LEs  to dorsal and plantar    min []  Vasopneumatic Device, press/temp:     min []  Other:    [x] Skin assessment post-treatment (if applicable):    [x]  intact    []  redness- no adverse reaction     []redness - adverse reaction:        45 min Therapeutic Exercise:  [x]  See flow sheet   Rationale:      increase ROM, increase strength, improve coordination, improve balance and increase proprioception to improve the patients ability to perform ADLs/ bending/stooping/ lifting/prolong sitting, stding and amb/ stairs with ease min Patient Education:  yes  Reviewed HEP   []  Progressed/Changed HEP based on:  Pt ed on importance and benefits of compliance with HEP, core strength/stability and proper posture; pt verbalized understanding         Other Objective/Functional Measures:    VCs + demo to perform proper technique for TE    Initiated lacrox ball to plantar surface of Both feet, standing hip ext/abd without c/o p!    demos fair form bridge   Post Treatment Pain Level (on 0 to 10) scale:   0  / 10     ASSESSMENT  Assessment/Changes in Function:   c/o glut med fatigue with hip abd  Progressed there-ex without c/o increase p! []  See Progress Note/Recertification   Patient will continue to benefit from skilled PT services to modify and progress therapeutic interventions, address functional mobility deficits, address ROM deficits, address strength deficits, analyze and address soft tissue restrictions, analyze and cue movement patterns, analyze and modify body mechanics/ergonomics, assess and modify postural abnormalities and instruct in home and community integration to attain remaining goals. Progress toward goals / Updated goals: · Short Term Goals: To be accomplished in  2  weeks:  1. Pt will be compliant with HEP for symptom management at home. MET  2. Pt will report reduced pain level to at least 5/10 in order to facilitate full participation in physical therapy activities. progressing, intermittent pain free  3. Pt will improve bilateral ankle DF PROM to at least 10 degrees in order to facilitate normal gait mechanics.     · Long Term Goals: To be accomplished in  4  weeks:  1. Pt will be independent with HEP at D/C for self management. 2. Pt will demonstrate bilateral great toe extension PROM to at least 80 degrees in order to normalize push off.   3. Pt will improve bilateral hip abd MMT to at least 4/5 in order to facilitate improved efficiency with ambulation.    4. Pt will increase FS FOTO Score to >/= 63 to indicate a significant decrease in functional disability.       PLAN  []  Upgrade activities as tolerated yes Continue plan of care   []  Discharge due to :    []  Other:      Therapist: Annita Chase PTA    Date: 2/21/2018 Time: 3:51 PM     Future Appointments  Date Time Provider Ely Coy   2/21/2018 4:00 PM Annita Chase PTA [facility]

## 2018-03-07 ENCOUNTER — HOSPITAL ENCOUNTER (OUTPATIENT)
Dept: PHYSICAL THERAPY | Age: 48
Discharge: HOME | End: 2018-03-07
Payer: COMMERCIAL

## 2018-03-07 PROCEDURE — 97110 THERAPEUTIC EXERCISES: CPT

## 2018-03-07 PROCEDURE — 97140 MANUAL THERAPY 1/> REGIONS: CPT

## 2018-03-07 NOTE — PROGRESS NOTES
PHYSICAL THERAPY - DAILY TREATMENT NOTE    Patient Name: Claudette Guerra        Date: 3/7/2018  : 1970   YES Patient  Verified  Visit #:   4     Insurance: Payor: Scott Chapin / Plan: Susy Begun PPO / Product Type: PPO /      In time: 4:00 Out time: 4:55   Total Treatment Time: 55     Medicare Time Tracking (below)   Total Timed Codes (min):  NA 1:1 Treatment Time:  NA     TREATMENT AREA = Bilateral foot pain [M79.671, M79.672]    SUBJECTIVE    Pain Level (on 0 to 10 scale):  5  / 10   Medication Changes/New allergies or changes in medical history, any new surgeries or procedures? NO    If yes, update Summary List   Subjective Functional Status/Changes:  []  No changes reported     \"I am not getting any better. My  has to massage my feet every night because they hurt so bad       OBJECTIVE    Therapeutic Procedures:  Min Procedure Specifics + Rationale   n/a [x]  Patient Education (performed throughout session) [x] Review HEP    [] Progressed/Changed HEP based on:   [] proper performance and advancement of Therex/TA   [] reduction in pain level    [] increased functional capacity       [] change in directional preference   40 [x] Therapeutic Exercise   [x]  See Flowsheet   Rationale: increase ROM and increase strength to improve the patients ability to participate in ADL's    15 [x]  Manual Therapy       [x] taping     [] IASTM     [] Scar Massage  [] X-friction       [] PNF         [] PROM    [] TDN (see objective; actual needle insertion time not billed)   [] Soft tissue mobz   [] Joint mobz: Gr I [] II []  III [] IV[] V[]:  Plantar fascia taping to bilateral feet concentrating on arch support and downward pull on calcaneus. Rationale: decrease pain, increase ROM, increase tissue extensibility, decrease trigger points and increase postural awareness to attain functional use and participation with ADL's       Other Objective/Functional Measures:    PT updated HEP.  PT trailed taping along the plantar fascia for arch support and downward pull on calcaneus. PT noted decreased flexibility of gastroc and soleus complex bilaterally. See flow sheet for more details. Progressed therex per flow sheet. Post Treatment Pain Level (on 0 to 10) scale:   0  / 10     ASSESSMENT    Assessment/Changes in Function:     Pt educated pt on wearing high heels and suggesting flats or shoes that place the pt closer to neutral foot position. []  See Plan of Care  []  See Progress Note/ Recertification  []  See Discharge Summary        Patient will continue to benefit from skilled PT services to modify and progress therapeutic interventions, address functional mobility deficits, address ROM deficits, address strength deficits, analyze and address soft tissue restrictions, analyze and cue movement patterns, analyze and modify body mechanics/ergonomics, assess and modify postural abnormalities, address imbalance/dizziness and instruct in home and community integration  to attain remaining goals   Progress toward goals / Updated goals:  PT updated HEP. Pt reports compliance with HEP     PLAN    [x]  Upgrade activities as tolerated  [x]  Update interventions per flow sheet YES Continue plan of care   []  Discharge due to :    []  Other:      Therapist: Montana Kimbrough DPT    Date: 3/7/2018 Time: 4:15 PM   No future appointments.

## 2018-04-11 ENCOUNTER — OFFICE VISIT (OUTPATIENT)
Dept: INTERNAL MEDICINE CLINIC | Age: 48
End: 2018-04-11

## 2018-04-11 VITALS
DIASTOLIC BLOOD PRESSURE: 99 MMHG | SYSTOLIC BLOOD PRESSURE: 168 MMHG | OXYGEN SATURATION: 99 % | HEART RATE: 78 BPM | RESPIRATION RATE: 16 BRPM | BODY MASS INDEX: 35.01 KG/M2 | TEMPERATURE: 96.3 F | WEIGHT: 231 LBS | HEIGHT: 68 IN

## 2018-04-11 DIAGNOSIS — I10 HYPERTENSION, UNSPECIFIED TYPE: Primary | ICD-10-CM

## 2018-04-11 DIAGNOSIS — E01.0 THYROMEGALY: ICD-10-CM

## 2018-04-11 DIAGNOSIS — E66.9 OBESITY (BMI 30-39.9): ICD-10-CM

## 2018-04-11 RX ORDER — AMLODIPINE AND OLMESARTAN MEDOXOMIL 10; 40 MG/1; MG/1
10-40 TABLET ORAL DAILY
Qty: 90 TAB | Refills: 3 | Status: CANCELLED | OUTPATIENT
Start: 2018-04-11

## 2018-04-11 RX ORDER — LISINOPRIL AND HYDROCHLOROTHIAZIDE 20; 25 MG/1; MG/1
1 TABLET ORAL DAILY
Qty: 30 TAB | Refills: 0 | Status: SHIPPED | OUTPATIENT
Start: 2018-04-11 | End: 2018-05-16 | Stop reason: SDUPTHER

## 2018-04-11 NOTE — PROGRESS NOTES
FAMILY MEDICINE CLINIC NOTE    S: The patient presents for follow up on her hypertension. She has been taking amlodipine-olmesartan for several years. She states that she frequently has lower extremity swelling. She is amenable to a trial of another antihypertensive. She ran out of medication yesterday and thus did not take any today. Reports no angina, dyspnea, palpitations, temperature intolerance, polyuria or polydipsia    Current Outpatient Prescriptions on File Prior to Visit   Medication Sig Dispense Refill    amLODIPine-Olmesartan (KAMALA) 10-40 mg tab Take 10-40 mg by mouth daily. Indications: Hypertension 90 Tab 3    ibuprofen (MOTRIN) 600 mg tablet Take 1 Tab by mouth every six (6) hours as needed for Pain. 30 Tab 0    methocarbamol (ROBAXIN-750) 750 mg tablet Take 1 Tab by mouth every six (6) hours as needed. 40 Tab 0     No current facility-administered medications on file prior to visit. Past Medical History:   Diagnosis Date    Hypertension        Social History     Social History    Marital status:      Spouse name: N/A    Number of children: N/A    Years of education: N/A     Occupational History    Not on file. Social History Main Topics    Smoking status: Never Smoker    Smokeless tobacco: Never Used    Alcohol use No    Drug use: No    Sexual activity: Yes     Partners: Male     Other Topics Concern    Not on file     Social History Narrative       Family History   Problem Relation Age of Onset    Dementia Father          O:  Visit Vitals    BP (!) 168/99 (BP 1 Location: Left arm, BP Patient Position: Sitting)    Pulse 78    Temp 96.3 °F (35.7 °C) (Oral)    Resp 16    Ht 5' 8\" (1.727 m)    Wt 231 lb (104.8 kg)    SpO2 99%    BMI 35.12 kg/m2     NAD, comfortable, obese  Bilateral symmetrical thyromegaly  RRR, no murmurs  CTABL, no wheezing/ronchi/rales  abd soft ND NT normoactive bs  No edema    49 y.o. female      ICD-10-CM ICD-9-CM    1.  Hypertension, unspecified type I10 401.9 lisinopril-hydroCHLOROthiazide (PRINZIDE, ZESTORETIC) 20-25 mg per tablet      TSH 3RD GENERATION      METABOLIC PANEL, COMPREHENSIVE  Discontinue amlodipine/olmesartan  Follow up in 2 weeks   2. Obesity (BMI 30-39. 9) E66.9 278.00 TSH 3RD GENERATION      LDL, DIRECT      HEMOGLOBIN A1C WITH EAG   3.  Thyromegaly E01.0 240.9 TSH 3RD GENERATION      US THYROID/PARATHYROID/SOFT TISS

## 2018-04-11 NOTE — MR AVS SNAPSHOT
[facility] 
 
 
 [address] [facility] [id number] 
[phone] Patient: Estrada Juarez MRN: [id number] :1970 Visit Information Date & Time Provider Department Dept. Phone Encounter #  
 2018  5:45 PM [address] [phone] [id number] Follow-up Instructions Return in about 2 weeks (around 2018). Upcoming Health Maintenance Date Due DTaP/Tdap/Td series (1 - Tdap) 1990 PAP AKA CERVICAL CYTOLOGY 1990 Allergies as of 2018  Review Complete On: 2018 By: Josh Hu MD  
 No Known Allergies Current Immunizations  Reviewed on 4/10/2018 No immunizations on file. Not reviewed this visit You Were Diagnosed With   
  
 Codes Comments Hypertension, unspecified type    -  Primary ICD-10-CM: I10 
ICD-9-CM: 401.9 Obesity (BMI 30-39. 9)     ICD-10-CM: E66.9 ICD-9-CM: 278.00 Thyromegaly     ICD-10-CM: E01.0 ICD-9-CM: 240.9 Vitals BP Pulse Temp Resp Height(growth percentile) Weight(growth percentile) (!) 168/99 (BP 1 Location: Left arm, BP Patient Position: Sitting) 78 96.3 °F (35.7 °C) (Oral) 16 5' 8\" (1.727 m) 231 lb (104.8 kg) SpO2 BMI OB Status Smoking Status 99% 35.12 kg/m2 Premenopausal Never Smoker Vitals History BMI and BSA Data Body Mass Index Body Surface Area  
 35.12 kg/m 2 2.24 m 2 Preferred Pharmacy Pharmacy Name Phone [id number] [address] [phone] Your Updated Medication List  
  
   
This list is accurate as of 18  6:08 PM.  Always use your most recent med list. amLODIPine-Olmesartan 10-40 mg Tab Commonly known as:  KAMALA Take 10-40 mg by mouth daily. Indications: Hypertension  
  
 ibuprofen 600 mg tablet Commonly known as:  MOTRIN  
 Take 1 Tab by mouth every six (6) hours as needed for Pain. lisinopril-hydroCHLOROthiazide 20-25 mg per tablet Commonly known as:  Miguel Davon Take 1 Tab by mouth daily. methocarbamol 750 mg tablet Commonly known as:  [id number] Take 1 Tab by mouth every six (6) hours as needed. Prescriptions Sent to Pharmacy Refills  
 lisinopril-hydroCHLOROthiazide (PRINZIDE, ZESTORETIC) 20-25 mg per tablet 0 Sig: Take 1 Tab by mouth daily. Class: Normal  
 Pharmacy: [address] [place] [address], [address] [place] [address] Ph #: [phone] Route: Oral  
  
Follow-up Instructions Return in about 2 weeks (around 4/24/2018). To-Do List   
 04/11/2018 Lab:  HEMOGLOBIN A1C WITH EAG   
  
 04/11/2018 Lab:  LDL, DIRECT   
  
 04/11/2018 Lab:  METABOLIC PANEL, COMPREHENSIVE   
  
 04/11/2018 Lab:  TSH 3RD GENERATION   
  
 04/12/2018 Imaging:  US THYROID/PARATHYROID/SOFT TISS Introducing [facility] & HEALTH SERVICES! Dear Sharon Otero: 
Thank you for requesting a [email or web address] account. Our records indicate that you already have an active [email or web address] account. You can access your account anytime at [email or web address] Did you know that you can access your hospital and ER discharge instructions at any time in [email or web address]? You can also review all of your test results from your hospital stay or ER visit. Additional Information If you have questions, please visit the Frequently Asked Questions section of the [email or web address] website at [email or web address] Remember, [email or web address] is NOT to be used for urgent needs. For medical emergencies, dial 911. Now available from your iPhone and Android! Please provide this summary of care documentation to your next provider. Your primary care clinician is listed as Phys Other. If you have any questions after today's visit, please call [phone].

## 2018-04-11 NOTE — PROGRESS NOTES
Abraham Latham presents today for   Chief Complaint   Patient presents with    Hypertension       Abraham Latham preferred language for health care discussion is english/other. Is someone accompanying this pt? No     Is the patient using any DME equipment during OV? No     Depression Screening:  PHQ over the last two weeks 4/11/2018 11/18/2014   Little interest or pleasure in doing things Not at all Not at all   Feeling down, depressed or hopeless Not at all Not at all   Total Score PHQ 2 0 0       Learning Assessment:  Learning Assessment 11/13/2017   PRIMARY LEARNER Patient   HIGHEST LEVEL OF EDUCATION - PRIMARY LEARNER  2 YEARS Carlos PRIMARY LEARNER NONE   CO-LEARNER CAREGIVER No   PRIMARY LANGUAGE ENGLISH   LEARNER PREFERENCE PRIMARY READING     DEMONSTRATION     VIDEOS   ANSWERED BY PATIENT   RELATIONSHIP SELF       Abuse Screening:  Abuse Screening Questionnaire 11/13/2017   Do you ever feel afraid of your partner? N   Are you in a relationship with someone who physically or mentally threatens you? N   Is it safe for you to go home? Y       Fall Risk  No flowsheet data found. Health Maintenance reviewed and discussed per provider. Yes    Abraham Latham is due for tap and pap. Please order/place referral if appropriate. Advance Directive:  1. Do you have an advance directive in place? Patient Reply: No     2. If not, would you like material regarding how to put one in place? Patient Reply: No     Coordination of Care:  1. Have you been to the ER, urgent care clinic since your last visit? Hospitalized since your last visit? Yes; [facility] for [address] 02/2017     2. Have you seen or consulted any other health care providers outside of the [address] [place] [address] since your last visit? Include any pap smears or colon screening.  No

## 2018-04-13 ENCOUNTER — HOSPITAL ENCOUNTER (OUTPATIENT)
Dept: ULTRASOUND IMAGING | Age: 48
Discharge: HOME | End: 2018-04-13
Attending: FAMILY MEDICINE
Payer: COMMERCIAL

## 2018-04-13 DIAGNOSIS — E01.0 THYROMEGALY: ICD-10-CM

## 2018-04-13 LAB
ALBUMIN SERPL-MCNC: 4.2 G/DL
ALP SERPL-CCNC: 135 U/L
ALT SERPL-CCNC: 14 IU/L
AST SERPL W P-5'-P-CCNC: 12 U/L
BILIRUB SERPL-MCNC: 0.4 MG/DL
BUN SERPL-MCNC: 10 MG/DL
BUN/CREATININE RATIO,BUCR: NORMAL
CALCIUM SERPL-MCNC: 8.4 MG/DL
CHLORIDE SERPL-SCNC: 105 MMOL/L
CO2 SERPL-SCNC: 25 MMOL/L
CREAT SERPL-MCNC: 0.78 MG/DL
EGFR: 90 ML/MIN/1.73M2
GLOBULIN,GLOB: 2.5 G/DL
GLUCOSE, GLC: 86 MG/DL
HEMOGLOBIN A1C: 4.8
LDL, DIRECT,DLDL: 102 MG/DL
Lab: 1.7
Lab: 104 ML/MIN/1.73M2
POTASSIUM SERPL-SCNC: 4.1 MMOL/L
PROT SERPL-MCNC: 6.7 G/DL
SODIUM SERPL-SCNC: 138 MMOL/L
TSH SERPL DL<=0.005 MIU/L-ACNC: 1.17 MIU/L

## 2018-04-13 PROCEDURE — 76536 US EXAM OF HEAD AND NECK: CPT

## 2018-04-16 ENCOUNTER — TELEPHONE (OUTPATIENT)
Dept: INTERNAL MEDICINE CLINIC | Age: 48
End: 2018-04-16

## 2018-04-16 DIAGNOSIS — E04.2 MULTINODULAR GOITER: Primary | ICD-10-CM

## 2018-04-16 NOTE — TELEPHONE ENCOUNTER
Contacted Una Vitor. Two patient Identifiers confirmed. Advised we needed reults for pts labs. Joanna New stated she did not have a record of labs or see labs in their system. She stated it showed collected but did now show anything else. Will check with .

## 2018-04-16 NOTE — TELEPHONE ENCOUNTER
Per e-mail pt requesting US results. Results already forwarded to pt via [employer]. Will advise on lab results once received.

## 2018-04-16 NOTE — PROGRESS NOTES
Please call this patient. Inform her that her thyroid ultrasound showed some nodules that it is recommended we get a sample to send to the lab for further testing. I have sent a referral to endocrinology and ordered an ultrasound guided fine needle aspiration to get samples of the nodules seen in her ultrasound.     Thank you    Dr. Jenel Schaumann

## 2018-04-16 NOTE — PROGRESS NOTES
The following result note has been reviewed. Please refer to patient email/mychart encounter created for further documentation.

## 2018-04-23 NOTE — PROGRESS NOTES
[facility] PHYSICAL THERAPY  [address] [place] [address] Viki Garcia, [address] - Phone: [phone]  Fax: [phone]  DISCHARGE SUMMARY FOR PHYSICAL THERAPY          Patient Name: Anthony Pradhan : 1970   Treatment/Medical Diagnosis: Bilateral foot pain [M79.671, M79.672]   Onset Date: Chronic (2 years)    Referral Source: Latisha Claude, DO Start of Care [facility]): 2018   Prior Hospitalization: See medical history Provider #: [id number]   Prior Level of Function: Independent with ambulation, walking >3x/wk   Comorbidities: Anxiety, Arthritis, LBP, GI disease, HTN, removal of pituitary tumor   Medications: Verified on Patient Summary List   Vis[facility] from [facility]: 4 Missed Visits: 0        Key Functional Changes/Progress: Pt treated a total of 4 visits and last seen on 3/7/2018. No objective progress to report. PT staff has called pt to schedule future appointments and Pt has not responded to schedule future appointments. Pt to be D/C due to noncompliance. G-Codes (GP): NA  Assessments/Recommendations: Discontinue therapy due to lack of attendance or compliance. If you have any questions/comments please contact us directly at [phone]. Thank you for allowing us to assist in the care of your patient. Therapist Signature:  Judge Flash DPT Date: 2018   Reporting Period: 18-3/8/2018 Time: 5:26 PM      Certification Period: NA       NOTE TO PHYSICIAN:  PLEASE COMPLETE THE ORDERS BELOW AND FAX TO   [facility]: [phone]. If you are unable to process this request in 24 hours please contact our office: [phone]. _x__ I have read the above report and request that my patient be discharged from therapy.      Physician Signature:        Date:       Time:

## 2018-04-24 ENCOUNTER — OFFICE VISIT (OUTPATIENT)
Dept: INTERNAL MEDICINE CLINIC | Age: 48
End: 2018-04-24

## 2018-04-24 VITALS
RESPIRATION RATE: 20 BRPM | DIASTOLIC BLOOD PRESSURE: 100 MMHG | SYSTOLIC BLOOD PRESSURE: 161 MMHG | BODY MASS INDEX: 34.86 KG/M2 | TEMPERATURE: 98 F | WEIGHT: 230 LBS | OXYGEN SATURATION: 98 % | HEART RATE: 70 BPM | HEIGHT: 68 IN

## 2018-04-24 DIAGNOSIS — I10 HYPERTENSION, UNSPECIFIED TYPE: Primary | ICD-10-CM

## 2018-04-24 RX ORDER — SPIRONOLACTONE 50 MG/1
50 TABLET, FILM COATED ORAL DAILY
Qty: 30 TAB | Refills: 1 | Status: SHIPPED | OUTPATIENT
Start: 2018-04-24 | End: 2018-05-22 | Stop reason: SDUPTHER

## 2018-04-24 NOTE — PROGRESS NOTES
FAMILY MEDICINE CLINIC NOTE    S: The patient presents for follow up on HTN. She had her antihypertensive regimen changed at last visit secondary to lower extremity edema. Amlodipine/olmesatran was discontinued and the patient was started on lisinopril/HCTZ. BP is still not well controlled, edema has improved somewhat but not much. No adverse reactions reported. O:  Visit Vitals    BP (!) 161/100 (BP 1 Location: Right arm, BP Patient Position: Sitting)    Pulse 70    Temp 98 °F (36.7 °C) (Oral)    Resp 20    Ht 5' 8\" (1.727 m)    Wt 230 lb (104.3 kg)    SpO2 98%    BMI 34.97 kg/m2     NAD, comfortable  RRR, no murmurs  CTABL, no wheezing/ronchi/rales  Mild non-pitting edema, bilateral LE     49 y.o. female      ICD-10-CM ICD-9-CM    1.  Hypertension, unspecified type I10 401.9 Add spironolactone (ALDACTONE) 50 mg tablet  Continue lisinopril/HCTZ  Follow up in 2 weeks

## 2018-04-24 NOTE — PROGRESS NOTES
ROOM # 9  Pt presents today for 2 week follow up htn. Pt reports BP medication is not working. Pt states has been having headaches and swelling. Ariana Cano presents today for   Chief Complaint   Patient presents with    Hypertension       Ariana Cano preferred language for health care discussion is english/other. Is someone accompanying this pt? no    Is the patient using any DME equipment during OV? no    Depression Screening:  PHQ over the last two weeks 4/11/2018 11/18/2014   Little interest or pleasure in doing things Not at all Not at all   Feeling down, depressed or hopeless Not at all Not at all   Total Score PHQ 2 0 0       Learning Assessment:  Learning Assessment 11/13/2017   PRIMARY LEARNER Patient   HIGHEST LEVEL OF EDUCATION - PRIMARY LEARNER  2 YEARS Carlos PRIMARY LEARNER NONE   CO-LEARNER CAREGIVER No   PRIMARY LANGUAGE ENGLISH   LEARNER PREFERENCE PRIMARY READING     DEMONSTRATION     VIDEOS   ANSWERED BY PATIENT   RELATIONSHIP SELF       Abuse Screening:  Abuse Screening Questionnaire 11/13/2017   Do you ever feel afraid of your partner? N   Are you in a relationship with someone who physically or mentally threatens you? N   Is it safe for you to go home? Y       Health Maintenance reviewed and discussed per provider. Yes    Ariana Cano is due for   Health Maintenance Due   Topic Date Due    DTaP/Tdap/Td series (1 - Tdap) 11/21/1990    PAP AKA CERVICAL CYTOLOGY  11/21/1990     Please order/place referral if appropriate. Advance Directive:  1. Do you have an advance directive in place? Patient Reply: no    2. If not, would you like material regarding how to put one in place? Patient Reply: no    Coordination of Care:  1. Have you been to the ER, urgent care clinic since your last visit? Hospitalized since your last visit? no    2.  Have you seen or consulted any other health care providers outside of the [address] [place] [address] since your last visit? Include any pap smears or colon screening.  no

## 2018-04-24 NOTE — MR AVS SNAPSHOT
[facility] 
 
 
 [address] [facility] [address] 
[phone] Patient: Cholo Cantu MRN: [id number] :1970 Visit Information Date & Time Provider Department Dept. Phone Encounter #  
 2018  5:45 PM [phone] [id number] Follow-up Instructions Return in about 2 weeks (around 2018). Upcoming Health Maintenance Date Due DTaP/Tdap/Td series (1 - Tdap) 1990 PAP AKA CERVICAL CYTOLOGY 1990 Allergies as of 2018  Review Complete On: 2018 By: Ronnie Curry MD  
 No Known Allergies Current Immunizations  Reviewed on 4/10/2018 No immunizations on file. Not reviewed this visit You Were Diagnosed With   
  
 Codes Comments Hypertension, unspecified type    -  Primary ICD-10-CM: I10 
ICD-9-CM: 401.9 Vitals BP Pulse Temp Resp Height(growth percentile) Weight(growth percentile) (!) 161/100 (BP 1 Location: Right arm, BP Patient Position: Sitting) 70 98 °F (36.7 °C) (Oral) 20 5' 8\" (1.727 m) 230 lb (104.3 kg) SpO2 BMI OB Status Smoking Status 98% 34.97 kg/m2 Premenopausal Never Smoker Vitals History BMI and BSA Data Body Mass Index Body Surface Area 34.97 kg/m 2 2.24 m 2 Preferred Pharmacy Pharmacy Name Phone [id number]- [address] [phone] Your Updated Medication List  
  
   
This list is accurate as of 18  6:11 PM.  Always use your most recent med list.  
  
  
  
  
 ibuprofen 600 mg tablet Commonly known as:  MOTRIN Take 1 Tab by mouth every six (6) hours as needed for Pain. lisinopril-hydroCHLOROthiazide 20-25 mg per tablet Commonly known as:  Leonetta Seeds Take 1 Tab by mouth daily. methocarbamol 750 mg tablet Commonly known as:  [id number] Take 1 Tab by mouth every six (6) hours as needed. spironolactone 50 mg tablet Commonly known as:  ALDACTONE Take 1 Tab by mouth daily. Prescriptions Sent to Pharmacy Refills  
 spironolactone (ALDACTONE) 50 mg tablet 1 Sig: Take 1 Tab by mouth daily. Class: Normal  
 Pharmacy: [address] [place] [address], [address] [place] [address], 29  #: [phone] Route: Oral  
  
Follow-up Instructions Return in about 2 weeks (around 5/8/2018). To-Do List   
 04/27/2018 9:00 AM  
  Appointment with [facility] ANGIO RADIOLOGIST; [facility] RAD US RM 1 at [facility] ([phone]) OUTSIDE FILMS  - Any outside films related to the study being scheduled should be brought with you on the day of the exam.  If this cannot be done there may be a delay in the reading of the study. MEDICATIONS  - Patient must bring a complete list of all medications currently taking to include prescriptions, over-the-counter meds, herbals, vitamins & any dietary supplements PATIENT ARRIVAL  Please report to the [facility] two hours prior to the scheduled appointment Introducing [facility] & HEALTH SERVICES! Dear Sharon Otero: 
Thank you for requesting a [employer] account. Our records indicate that you already have an active [employer] account. You can access your account anytime at [email or web address] Did you know that you can access your hospital and ER discharge instructions at any time in [employer]? You can also review all of your test results from your hospital stay or ER visit. Additional Information If you have questions, please visit the Frequently Asked Questions section of the [employer] website at [email or web address] Remember, [employer] is NOT to be used for urgent needs. For medical emergencies, dial 911. Now available from your iPhone and Android! Please provide this summary of care documentation to your next provider. Your primary care clinician is listed as Phys Other. If you have any questions after today's visit, please call [phone].

## 2018-04-27 ENCOUNTER — HOSPITAL ENCOUNTER (OUTPATIENT)
Dept: ULTRASOUND IMAGING | Age: 48
Discharge: HOME | End: 2018-04-27
Attending: FAMILY MEDICINE
Payer: COMMERCIAL

## 2018-04-27 DIAGNOSIS — E04.2 MULTINODULAR GOITER: ICD-10-CM

## 2018-04-27 PROCEDURE — 88173 CYTOPATH EVAL FNA REPORT: CPT | Performed by: FAMILY MEDICINE

## 2018-04-27 PROCEDURE — 74011000250 HC RX REV CODE- 250: Performed by: FAMILY MEDICINE

## 2018-04-27 PROCEDURE — 74011250637 HC RX REV CODE- 250/637: Performed by: RADIOLOGY

## 2018-04-27 PROCEDURE — 10022 US GUIDE FINE NDL ASP THYROID: CPT

## 2018-04-27 PROCEDURE — 88172 CYTP DX EVAL FNA 1ST EA SITE: CPT | Performed by: FAMILY MEDICINE

## 2018-04-27 RX ORDER — LIDOCAINE HYDROCHLORIDE 10 MG/ML
10 INJECTION INFILTRATION; PERINEURAL
Status: COMPLETED | OUTPATIENT
Start: 2018-04-27 | End: 2018-04-27

## 2018-04-27 RX ORDER — DIAZEPAM 5 MG/1
5 TABLET ORAL
Status: COMPLETED | OUTPATIENT
Start: 2018-04-27 | End: 2018-04-27

## 2018-04-27 RX ORDER — LIDOCAINE HYDROCHLORIDE 10 MG/ML
10 INJECTION, SOLUTION EPIDURAL; INFILTRATION; INTRACAUDAL; PERINEURAL
Status: DISCONTINUED | OUTPATIENT
Start: 2018-04-27 | End: 2018-04-27

## 2018-04-27 RX ADMIN — DIAZEPAM 5 MG: 5 TABLET ORAL at 09:57

## 2018-04-27 RX ADMIN — LIDOCAINE HYDROCHLORIDE 10 ML: 10 INJECTION, SOLUTION INFILTRATION; PERINEURAL at 11:01

## 2018-04-27 RX ADMIN — DIAZEPAM 5 MG: 5 TABLET ORAL at 09:30

## 2018-04-27 NOTE — DISCHARGE INSTRUCTIONS
Valium (Diazepam) Discharge instructions    You received the narcotic listed above. This medication was given to you to help decrease your anxiety/discomfort and allow you to complete your examination       Common Side Effects:    Impairment of memory for up to 24 hours, sleepiness, slurred speech, dizziness, visual disturbance such as blurred vision or difficulty focusing, confusion, euphoria (pain relief), chills, ears feeling blocked or dreaming. Please call the [facility] Radiology department at [phone] for specific questions following your exam.   It is recommended that you do not drive or operate equipment for at least 24 hours. You should not drink any alcoholic beverages for at least 24 hours. Do not take any other muscle relaxers, sedatives, hypnotics, or mood altering medications for 24 hours unless ordered by your physician who is aware that you received this medication. Call your physician for any questions or problems. He/She may contact this Radiology Department for further information.     Cornelio De La Garza RN

## 2018-04-27 NOTE — PROCEDURES
Vascular & Interventional Radiology Brief Procedure Note    Interventional Radiologist: Shaquille Mccrary    Anesthesia:  Local     Complications: None    Estimated Blood Loss:  minimal    Tubes and Drains: None    Specimens: FNA samples of the right mid and right lower thyroid nodules recommended in the thyroid ultrasound report    Condition: Good    Disposition:  outpt    Plan: routine follow up      Trevor Nagel MD  [facility]  Vascular & Interventional Radiology  4/27/2018

## 2018-04-30 ENCOUNTER — TELEPHONE (OUTPATIENT)
Dept: INTERNAL MEDICINE CLINIC | Age: 48
End: 2018-04-30

## 2018-04-30 NOTE — PROGRESS NOTES
Please call this patient, inform her that the results of her thyroid biopsy are in and show that the nodules are non-cancerous, the reccommendation is for repeat thyroid ultrasound in 1 year.      Dr. Milly Guadarrama

## 2018-04-30 NOTE — TELEPHONE ENCOUNTER
Per Dr Bonnie Velásquez most recent lab results are normal except, calcium level is slightly low pt can take an OTC calcium supplement to help with this. Notes Recorded by Toma Goddard MD on 4/30/2018 at 3:59 PM  Please call this patient, inform her that the results of her thyroid biopsy are in and show that the nodules are non-cancerous, the reccommendation is for repeat thyroid ultrasound in 1 year.      Dr. Bonnie Velásquez

## 2018-05-16 ENCOUNTER — OFFICE VISIT (OUTPATIENT)
Dept: INTERNAL MEDICINE CLINIC | Age: 48
End: 2018-05-16

## 2018-05-16 VITALS
BODY MASS INDEX: 34.86 KG/M2 | SYSTOLIC BLOOD PRESSURE: 137 MMHG | WEIGHT: 230 LBS | HEART RATE: 73 BPM | HEIGHT: 68 IN | RESPIRATION RATE: 16 BRPM | OXYGEN SATURATION: 100 % | DIASTOLIC BLOOD PRESSURE: 90 MMHG | TEMPERATURE: 97.2 F

## 2018-05-16 DIAGNOSIS — M25.473 ANKLE SWELLING, UNSPECIFIED LATERALITY: ICD-10-CM

## 2018-05-16 DIAGNOSIS — I10 HYPERTENSION, UNSPECIFIED TYPE: ICD-10-CM

## 2018-05-16 DIAGNOSIS — I10 ESSENTIAL HYPERTENSION: Primary | ICD-10-CM

## 2018-05-16 RX ORDER — FUROSEMIDE 20 MG/1
TABLET ORAL
Qty: 14 TAB | Refills: 0 | Status: SHIPPED | OUTPATIENT
Start: 2018-05-16 | End: 2019-01-17

## 2018-05-16 RX ORDER — LISINOPRIL AND HYDROCHLOROTHIAZIDE 20; 25 MG/1; MG/1
1 TABLET ORAL DAILY
Qty: 30 TAB | Refills: 0 | Status: SHIPPED | OUTPATIENT
Start: 2018-05-16 | End: 2018-05-16 | Stop reason: SDUPTHER

## 2018-05-16 RX ORDER — LISINOPRIL AND HYDROCHLOROTHIAZIDE 20; 25 MG/1; MG/1
1 TABLET ORAL DAILY
Qty: 30 TAB | Refills: 1 | Status: SHIPPED | OUTPATIENT
Start: 2018-05-16 | End: 2018-05-16 | Stop reason: SDUPTHER

## 2018-05-16 RX ORDER — LISINOPRIL AND HYDROCHLOROTHIAZIDE 20; 25 MG/1; MG/1
1 TABLET ORAL DAILY
Qty: 30 TAB | Refills: 2 | Status: SHIPPED | OUTPATIENT
Start: 2018-05-16 | End: 2018-06-07 | Stop reason: SDUPTHER

## 2018-05-16 NOTE — PATIENT INSTRUCTIONS
DASH Diet: Care Instructions  Your Care Instructions    The DASH diet is an eating plan that can help lower your blood pressure. DASH stands for Dietary Approaches to Stop Hypertension. Hypertension is high blood pressure. The DASH diet focuses on eating foods that are high in calcium, potassium, and magnesium. These nutrients can lower blood pressure. The foods that are highest in these nutrients are fruits, vegetables, low-fat dairy products, nuts, seeds, and legumes. But taking calcium, potassium, and magnesium supplements instead of eating foods that are high in those nutrients does not have the same effect. The DASH diet also includes whole grains, fish, and poultry. The DASH diet is one of several lifestyle changes your doctor may recommend to lower your high blood pressure. Your doctor may also want you to decrease the amount of sodium in your diet. Lowering sodium while following the DASH diet can lower blood pressure even further than just the DASH diet alone. Follow-up care is a key part of your treatment and safety. Be sure to make and go to all appointments, and call your doctor if you are having problems. It's also a good idea to know your test results and keep a list of the medicines you take. How can you care for yourself at home? Following the DASH diet  · Eat 4 to 5 servings of fruit each day. A serving is 1 medium-sized piece of fruit, ½ cup chopped or canned fruit, 1/4 cup dried fruit, or 4 ounces (½ cup) of fruit juice. Choose fruit more often than fruit juice. · Eat 4 to 5 servings of vegetables each day. A serving is 1 cup of lettuce or raw leafy vegetables, ½ cup of chopped or cooked vegetables, or 4 ounces (½ cup) of vegetable juice. Choose vegetables more often than vegetable juice. · Get 2 to 3 servings of low-fat and fat-free dairy each day. A serving is 8 ounces of milk, 1 cup of yogurt, or 1 ½ ounces of cheese. · Eat 6 to 8 servings of grains each day.  A serving is 1 slice of bread, 1 ounce of dry cereal, or ½ cup of cooked rice, pasta, or cooked cereal. Try to choose whole-grain products as much as possible. · Limit lean meat, poultry, and fish to 2 servings each day. A serving is 3 ounces, about the size of a deck of cards. · Eat 4 to 5 servings of nuts, seeds, and legumes (cooked dried beans, lentils, and split peas) each week. A serving is 1/3 cup of nuts, 2 tablespoons of seeds, or ½ cup of cooked beans or peas. · Limit fats and oils to 2 to 3 servings each day. A serving is 1 teaspoon of vegetable oil or 2 tablespoons of salad dressing. · Limit sweets and added sugars to 5 servings or less a week. A serving is 1 tablespoon jelly or jam, ½ cup sorbet, or 1 cup of lemonade. · Eat less than 2,300 milligrams (mg) of sodium a day. If you limit your sodium to 1,500 mg a day, you can lower your blood pressure even more. Tips for success  · Start small. Do not try to make dramatic changes to your diet all at once. You might feel that you are missing out on your favorite foods and then be more likely to not follow the plan. Make small changes, and stick with them. Once those changes become habit, add a few more changes. · Try some of the following:  ¨ Make it a goal to eat a fruit or vegetable at every meal and at snacks. This will make it easy to get the recommended amount of fruits and vegetables each day. ¨ Try yogurt topped with fruit and nuts for a snack or healthy dessert. ¨ Add lettuce, tomato, cucumber, and onion to sandwiches. ¨ Combine a ready-made pizza crust with low-fat mozzarella cheese and lots of vegetable toppings. Try using tomatoes, squash, spinach, broccoli, carrots, cauliflower, and onions. ¨ Have a variety of cut-up vegetables with a low-fat dip as an appetizer instead of chips and dip. ¨ Sprinkle sunflower seeds or chopped almonds over salads. Or try adding chopped walnuts or almonds to cooked vegetables.   ¨ Try some vegetarian meals using beans and peas. Add garbanzo or kidney beans to salads. Make burritos and tacos with mashed [place] beans or black beans. Where can you learn more? Go to http://bertha-carmelita.info/. Enter S962 in the search box to learn more about \"DASH Diet: Care Instructions. \"  Current as of: September 21, 2016  Content Version: 11.4  © 7599-7926 [employer]. Care instructions adapted under license by [employer] (which disclaims liability or warranty for this information). If you have questions about a medical condition or this instruction, always ask your healthcare professional. [address] any warranty or liability for your use of this information. High Blood Pressure: Care Instructions  Your Care Instructions    If your blood pressure is usually above 140/90, you have high blood pressure, or hypertension. That means the top number is 140 or higher or the bottom number is 90 or higher, or both. Despite what a lot of people think, high blood pressure usually doesn't cause headaches or make you feel dizzy or lightheaded. It usually has no symptoms. But it does increase your risk for heart attack, stroke, and kidney or eye damage. The higher your blood pressure, the more your risk increases. Your doctor will give you a goal for your blood pressure. Your goal will be based on your health and your age. An example of a goal is to keep your blood pressure below 140/90. Lifestyle changes, such as eating healthy and being active, are always important to help lower blood pressure. You might also take medicine to reach your blood pressure goal.  Follow-up care is a key part of your treatment and safety. Be sure to make and go to all appointments, and call your doctor if you are having problems. It's also a good idea to know your test results and keep a list of the medicines you take. How can you care for yourself at home?   Medical treatment  · If you stop taking your medicine, your blood pressure will go back up. You may take one or more types of medicine to lower your blood pressure. Be safe with medicines. Take your medicine exactly as prescribed. Call your doctor if you think you are having a problem with your medicine. · Talk to your doctor before you start taking aspirin every day. Aspirin can help certain people lower their risk of a heart attack or stroke. But taking aspirin isn't right for everyone, because it can cause serious bleeding. · See your doctor regularly. You may need to see the doctor more often at first or until your blood pressure comes down. · If you are taking blood pressure medicine, talk to your doctor before you take decongestants or anti-inflammatory medicine, such as ibuprofen. Some of these medicines can raise blood pressure. · Learn how to check your blood pressure at home. Lifestyle changes  · Stay at a healthy weight. This is especially important if you put on weight around the waist. Losing even 10 pounds can help you lower your blood pressure. · If your doctor recommends it, get more exercise. Walking is a good choice. Bit by bit, increase the amount you walk every day. Try for at least 30 minutes on most days of the week. You also may want to swim, bike, or do other activities. · Avoid or limit alcohol. Talk to your doctor about whether you can drink any alcohol. · Try to limit how much sodium you eat to less than 2,300 milligrams (mg) a day. Your doctor may ask you to try to eat less than 1,500 mg a day. · Eat plenty of fruits (such as bananas and oranges), vegetables, legumes, whole grains, and low-fat dairy products. · Lower the amount of saturated fat in your diet. Saturated fat is found in animal products such as milk, cheese, and meat. Limiting these foods may help you lose weight and also lower your risk for heart disease. · Do not smoke. Smoking increases your risk for heart attack and stroke.  If you need help quitting, talk to your doctor about stop-smoking programs and medicines. These can increase your chances of quitting for good. When should you call for help? Call 911 anytime you think you may need emergency care. This may mean having symptoms that suggest that your blood pressure is causing a serious heart or blood vessel problem. Your blood pressure may be over 180/110. ? For example, call 911 if:  ? · You have symptoms of a heart attack. These may include:  ¨ Chest pain or pressure, or a strange feeling in the chest.  ¨ Sweating. ¨ Shortness of breath. ¨ Nausea or vomiting. ¨ Pain, pressure, or a strange feeling in the back, neck, jaw, or upper belly or in one or both shoulders or arms. ¨ Lightheadedness or sudden weakness. ¨ A fast or irregular heartbeat. ? · You have symptoms of a stroke. These may include:  ¨ Sudden numbness, tingling, weakness, or loss of movement in your face, arm, or leg, especially on only one side of your body. ¨ Sudden vision changes. ¨ Sudden trouble speaking. ¨ Sudden confusion or trouble understanding simple statements. ¨ Sudden problems with walking or balance. ¨ A sudden, severe headache that is different from past headaches. ? · You have severe back or belly pain. ?Do not wait until your blood pressure comes down on its own. Get help right away. ?Call your doctor now or seek immediate care if:  ? · Your blood pressure is much higher than normal (such as 180/110 or higher), but you don't have symptoms. ? · You think high blood pressure is causing symptoms, such as:  ¨ Severe headache. ¨ Blurry vision. ? Watch closely for changes in your health, and be sure to contact your doctor if:  ? · Your blood pressure measures 140/90 or higher at least 2 times. That means the top number is 140 or higher or the bottom number is 90 or higher, or both. ? · You think you may be having side effects from your blood pressure medicine.    ? · Your blood pressure is usually normal, but it goes above normal at least 2 times. Where can you learn more? Go to http://bertha-carmelita.info/. Enter L390 in the search box to learn more about \"High Blood Pressure: Care Instructions. \"  Current as of: September 21, 2016  Content Version: 11.4  © 4112-5797 [employer]. Care instructions adapted under license by [employer] (which disclaims liability or warranty for this information). If you have questions about a medical condition or this instruction, always ask your healthcare professional. [address] any warranty or liability for your use of this information. High Blood Pressure: Care Instructions  Your Care Instructions    If your blood pressure is usually above 140/90, you have high blood pressure, or hypertension. That means the top number is 140 or higher or the bottom number is 90 or higher, or both. Despite what a lot of people think, high blood pressure usually doesn't cause headaches or make you feel dizzy or lightheaded. It usually has no symptoms. But it does increase your risk for heart attack, stroke, and kidney or eye damage. The higher your blood pressure, the more your risk increases. Your doctor will give you a goal for your blood pressure. Your goal will be based on your health and your age. An example of a goal is to keep your blood pressure below 140/90. Lifestyle changes, such as eating healthy and being active, are always important to help lower blood pressure. You might also take medicine to reach your blood pressure goal.  Follow-up care is a key part of your treatment and safety. Be sure to make and go to all appointments, and call your doctor if you are having problems. It's also a good idea to know your test results and keep a list of the medicines you take. How can you care for yourself at home? Medical treatment  · If you stop taking your medicine, your blood pressure will go back up.  You may take one or more types of medicine to lower your blood pressure. Be safe with medicines. Take your medicine exactly as prescribed. Call your doctor if you think you are having a problem with your medicine. · Talk to your doctor before you start taking aspirin every day. Aspirin can help certain people lower their risk of a heart attack or stroke. But taking aspirin isn't right for everyone, because it can cause serious bleeding. · See your doctor regularly. You may need to see the doctor more often at first or until your blood pressure comes down. · If you are taking blood pressure medicine, talk to your doctor before you take decongestants or anti-inflammatory medicine, such as ibuprofen. Some of these medicines can raise blood pressure. · Learn how to check your blood pressure at home. Lifestyle changes  · Stay at a healthy weight. This is especially important if you put on weight around the waist. Losing even 10 pounds can help you lower your blood pressure. · If your doctor recommends it, get more exercise. Walking is a good choice. Bit by bit, increase the amount you walk every day. Try for at least 30 minutes on most days of the week. You also may want to swim, bike, or do other activities. · Avoid or limit alcohol. Talk to your doctor about whether you can drink any alcohol. · Try to limit how much sodium you eat to less than 2,300 milligrams (mg) a day. Your doctor may ask you to try to eat less than 1,500 mg a day. · Eat plenty of fruits (such as bananas and oranges), vegetables, legumes, whole grains, and low-fat dairy products. · Lower the amount of saturated fat in your diet. Saturated fat is found in animal products such as milk, cheese, and meat. Limiting these foods may help you lose weight and also lower your risk for heart disease. · Do not smoke. Smoking increases your risk for heart attack and stroke. If you need help quitting, talk to your doctor about stop-smoking programs and medicines.  These can increase your chances of quitting for good. When should you call for help? Call 911 anytime you think you may need emergency care. This may mean having symptoms that suggest that your blood pressure is causing a serious heart or blood vessel problem. Your blood pressure may be over 180/110. ? For example, call 911 if:  ? · You have symptoms of a heart attack. These may include:  ¨ Chest pain or pressure, or a strange feeling in the chest.  ¨ Sweating. ¨ Shortness of breath. ¨ Nausea or vomiting. ¨ Pain, pressure, or a strange feeling in the back, neck, jaw, or upper belly or in one or both shoulders or arms. ¨ Lightheadedness or sudden weakness. ¨ A fast or irregular heartbeat. ? · You have symptoms of a stroke. These may include:  ¨ Sudden numbness, tingling, weakness, or loss of movement in your face, arm, or leg, especially on only one side of your body. ¨ Sudden vision changes. ¨ Sudden trouble speaking. ¨ Sudden confusion or trouble understanding simple statements. ¨ Sudden problems with walking or balance. ¨ A sudden, severe headache that is different from past headaches. ? · You have severe back or belly pain. ?Do not wait until your blood pressure comes down on its own. Get help right away. ?Call your doctor now or seek immediate care if:  ? · Your blood pressure is much higher than normal (such as 180/110 or higher), but you don't have symptoms. ? · You think high blood pressure is causing symptoms, such as:  ¨ Severe headache. ¨ Blurry vision. ? Watch closely for changes in your health, and be sure to contact your doctor if:  ? · Your blood pressure measures 140/90 or higher at least 2 times. That means the top number is 140 or higher or the bottom number is 90 or higher, or both. ? · You think you may be having side effects from your blood pressure medicine. ? · Your blood pressure is usually normal, but it goes above normal at least 2 times. Where can you learn more?   Go to http://bertha-carmelita.info/. Enter D144 in the search box to learn more about \"High Blood Pressure: Care Instructions. \"  Current as of: September 21, 2016  Content Version: 11.4  © 9174-2698 [employer]. Care instructions adapted under license by [employer] (which disclaims liability or warranty for this information). If you have questions about a medical condition or this instruction, always ask your healthcare professional. [place] [address] any warranty or liability for your use of this information. Low Sodium Diet (2,000 Milligram): Care Instructions  Your Care Instructions    Too much sodium causes your body to hold on to extra water. This can raise your blood pressure and force your heart and kidneys to work harder. In very serious cases, this could cause you to be put in the hospital. It might even be life-threatening. By limiting sodium, you will feel better and lower your risk of serious problems. The most common source of sodium is salt. People get most of the salt in their diet from canned, prepared, and packaged foods. Fast food and restaurant meals also are very high in sodium. Your doctor will probably limit your sodium to less than 2,000 milligrams (mg) a day. This limit counts all the sodium in prepared and packaged foods and any salt you add to your food. Follow-up care is a key part of your treatment and safety. Be sure to make and go to all appointments, and call your doctor if you are having problems. It's also a good idea to know your test results and keep a list of the medicines you take. How can you care for yourself at home? Read food labels  · Read labels on cans and food packages. The labels tell you how much sodium is in each serving. Make sure that you look at the serving size. If you eat more than the serving size, you have eaten more sodium. · Food labels also tell you the Percent Daily Value for sodium.  Choose products with low Percent Daily Values for sodium. · Be aware that sodium can come in forms other than salt, including monosodium glutamate (MSG), sodium citrate, and sodium bicarbonate (baking soda). MSG is often added to Asian food. When you eat out, you can sometimes ask for food without MSG or added salt. Buy low-sodium foods  · Buy foods that are labeled \"unsalted\" (no salt added), \"sodium-free\" (less than 5 mg of sodium per serving), or \"low-sodium\" (less than 140 mg of sodium per serving). Foods labeled \"reduced-sodium\" and \"light sodium\" may still have too much sodium. Be sure to read the label to see how much sodium you are getting. · Buy fresh vegetables, or frozen vegetables without added sauces. Buy low-sodium versions of canned vegetables, soups, and other canned goods. Prepare low-sodium meals  · Cut back on the amount of salt you use in cooking. This will help you adjust to the taste. Do not add salt after cooking. One teaspoon of salt has about 2,300 mg of sodium. · Take the salt shaker off the table. · Flavor your food with garlic, lemon juice, onion, vinegar, herbs, and spices. Do not use soy sauce, lite soy sauce, steak sauce, onion salt, garlic salt, celery salt, mustard, or ketchup on your food. · Use low-sodium salad dressings, sauces, and ketchup. Or make your own salad dressings and sauces without adding salt. · Use less salt (or none) when recipes call for it. You can often use half the salt a recipe calls for without losing flavor. Other foods such as rice, pasta, and grains do not need added salt. · Rinse canned vegetables, and cook them in fresh water. This removes some-but not all-of the salt. · Avoid water that is naturally high in sodium or that has been treated with water softeners, which add sodium. Call your local water company to find out the sodium content of your water supply. If you buy bottled water, read the label and choose a sodium-free brand.   Avoid high-sodium foods  · Avoid eating:  ¨ Smoked, cured, salted, and canned meat, fish, and poultry. ¨ Ham, ochoa, hot dogs, and luncheon meats. ¨ Regular, hard, and processed cheese and regular peanut butter. ¨ Crackers with salted tops, and other salted snack foods such as pretzels, chips, and salted popcorn. ¨ Frozen prepared meals, unless labeled low-sodium. ¨ Canned and dried soups, broths, and bouillon, unless labeled sodium-free or low-sodium. ¨ Canned vegetables, unless labeled sodium-free or low-sodium. ¨ [place] fries, pizza, tacos, and other fast foods. ¨ Pickles, olives, ketchup, and other condiments, especially soy sauce, unless labeled sodium-free or low-sodium. Where can you learn more? Go to http://bertha-carmelita.info/. Enter R014 in the search box to learn more about \"Low Sodium Diet (2,000 Milligram): Care Instructions. \"  Current as of: May 12, 2017  Content Version: 11.4  © 5458-5131 [employer]. Care instructions adapted under license by [employer] (which disclaims liability or warranty for this information). If you have questions about a medical condition or this instruction, always ask your healthcare professional. [place] [address] any warranty or liability for your use of this information.

## 2018-05-16 NOTE — PROGRESS NOTES
Patient presents for HTN and ankle swelling f/u, was last seen here on the 4/24 and started on Lisinopril-HCTZ 20-25 and prescribed Aldactone 50 mg, states she wants to change her BP meds because she believes it is not working, states she wants to know why her ankles are still swollen. Patient states she has not taken the prescribed Aldactone because she was not able to swallow the pill RT size, states she sits long days at work, she does not elevate her legs at home nor wear compression stockings. Advised patient there is no indication for HTN med change at this time based on her current BP, discuss switching to lasix every other day x 1 week, elevate legs, increase mobility at work, wear compression stockings, life style changes,losing weight. Patient denies any HA,blurry vision, unilateral weakness, slurred speech,facial drooling,drooping, NV,numbness,tingling,dizziness,palpitations, malaise,faigue,confusion,SOB,CP.

## 2018-05-16 NOTE — PROGRESS NOTES
HISTORY OF PRESENT ILLNESS  Kasandra Guzman is a 49 y.o. female. Patient presents for HTN and ankle swelling f/u, was last seen here on the 4/24 and started on Lisinopril-HCTZ 20-25 and prescribed Aldactone 50 mg, states she wants to change her BP meds because she believes it is not working, states she wants to know why her ankles are still swollen. Patient states she has not taken the prescribed Aldactone because she was not able to swallow the pill RT size, states she sits long days at work, she does not elevate her legs at home nor wear compression stockings. Advised patient there is no indication for HTN med change at this time based on her current BP, discuss switching to lasix every other day x 1 week, elevate legs, increase mobility at work, wear compression stockings, life style changes,losing weight. Patient denies any HA,blurry vision, unilateral weakness, slurred speech,facial drooling,drooping, NV,numbness,tingling,dizziness,palpitations, malaise,faigue,confusion,SOB,CP. Results   The history is provided by the patient. Medication Evaluation         Review of Systems   Constitutional: Negative. HENT: Negative. Eyes: Negative. Respiratory: Negative. Cardiovascular: Positive for leg swelling. Trace non pitting edema to bilateral ankles. Gastrointestinal: Negative. Genitourinary: Negative. Musculoskeletal: Negative. Skin: Negative. Neurological: Negative. Psychiatric/Behavioral: Negative. Physical Exam   Constitutional: She is oriented to person, place, and time. She appears well-developed and well-nourished. /90 (BP 1 Location: Right arm, BP Patient Position: Sitting)  Pulse 73  Temp 97.2 °F (36.2 °C) (Oral)   Resp 16  Ht 5' 8\" (1.727 m)  Wt 230 lb (104.3 kg)  SpO2 100%  BMI 34.97 kg/m2     HENT:   Head: Normocephalic and atraumatic. Eyes: Conjunctivae and EOM are normal. Pupils are equal, round, and reactive to light.    Neck: Normal range of motion. Cardiovascular: Normal rate. Pulmonary/Chest: Effort normal and breath sounds normal.   Musculoskeletal: Normal range of motion. Neurological: She is alert and oriented to person, place, and time. GCS eye subscore is 4. GCS verbal subscore is 5. GCS motor subscore is 6. Skin: Skin is warm and dry. Psychiatric: She has a normal mood and affect. Her speech is normal and behavior is normal. Judgment and thought content normal. Cognition and memory are normal.   Vitals reviewed. ASSESSMENT and PLAN    ICD-10-CM ICD-9-CM    1. Essential hypertension I10 401.9    2. Ankle swelling, unspecified laterality M25.473 719.07 furosemide (LASIX) 20 mg tablet      AMB SUPPLY ORDER   3. Hypertension, unspecified type I10 401.9 lisinopril-hydroCHLOROthiazide (PRINZIDE, ZESTORETIC) 20-25 mg per tablet      DISCONTINUED: lisinopril-hydroCHLOROthiazide (PRINZIDE, ZESTORETIC) 20-25 mg per tablet      DISCONTINUED: lisinopril-hydroCHLOROthiazide (PRINZIDE, ZESTORETIC) 20-25 mg per tablet     Encounter Diagnoses   Name Primary?  Essential hypertension Yes    Ankle swelling, unspecified laterality     Hypertension, unspecified type      Orders Placed This Encounter    AMB SUPPLY ORDER    furosemide (LASIX) 20 mg tablet    DISCONTD: lisinopril-hydroCHLOROthiazide (PRINZIDE, ZESTORETIC) 20-25 mg per tablet    DISCONTD: lisinopril-hydroCHLOROthiazide (PRINZIDE, ZESTORETIC) 20-25 mg per tablet    lisinopril-hydroCHLOROthiazide (PRINZIDE, ZESTORETIC) 20-25 mg per tablet     Orders Placed This Encounter    AMB SUPPLY ORDER     Supply compressions stockings    furosemide (LASIX) 20 mg tablet     Sig: Take one Pill every other day x 1 week. Indications: Edema     Dispense:  14 Tab     Refill:  0    DISCONTD: lisinopril-hydroCHLOROthiazide (PRINZIDE, ZESTORETIC) 20-25 mg per tablet     Sig: Take 1 Tab by mouth daily.      Dispense:  30 Tab     Refill:  0    DISCONTD: lisinopril-hydroCHLOROthiazide (PRINZIDE, ZESTORETIC) 20-25 mg per tablet     Sig: Take 1 Tab by mouth daily. Dispense:  30 Tab     Refill:  1    lisinopril-hydroCHLOROthiazide (PRINZIDE, ZESTORETIC) 20-25 mg per tablet     Sig: Take 1 Tab by mouth daily. Dispense:  30 Tab     Refill:  2     Orders Placed This Encounter    AMB SUPPLY ORDER    furosemide (LASIX) 20 mg tablet    DISCONTD: lisinopril-hydroCHLOROthiazide (PRINZIDE, ZESTORETIC) 20-25 mg per tablet    DISCONTD: lisinopril-hydroCHLOROthiazide (PRINZIDE, ZESTORETIC) 20-25 mg per tablet    lisinopril-hydroCHLOROthiazide (PRINZIDE, ZESTORETIC) 20-25 mg per tablet     Diagnoses and all orders for this visit:    1. Essential hypertension    2. Ankle swelling, unspecified laterality  -     furosemide (LASIX) 20 mg tablet; Take one Pill every other day x 1 week. Indications: Edema  -     AMB SUPPLY ORDER    3. Hypertension, unspecified type  -     lisinopril-hydroCHLOROthiazide (PRINZIDE, ZESTORETIC) 20-25 mg per tablet; Take 1 Tab by mouth daily. Follow-up Disposition:  Return in about 3 months (around 8/16/2018). current treatment plan is effective, no change in therapy  the following changes in treatment are made: Advised patient there is no indication for HTN med change at this time based on her current BP, discuss switching to lasix every other day x 1 week, elevate legs, increase mobility at work, wear compression stockings, life style changes,losing weight.

## 2018-05-16 NOTE — MR AVS SNAPSHOT
[facility] 
 
 
 [address] [id number] 
[phone] Patient: Fernando Pdailla MRN: [id number] :1970 Visit Information Date & Time Provider Department Dept. Phone Encounter #  
 2018  4:00 PM [address] [phone] [id number] Follow-up Instructions Return in about 3 months (around 2018). Your Appointments 2018  5:00 PM  
Office Visit with MD [address] [facility]) Appt Note: EST care with PCP  
 [address]  
  
   
 [address] [facility] Upcoming Health Maintenance Date Due DTaP/Tdap/Td series (1 - Tdap) 1990 PAP AKA CERVICAL CYTOLOGY 1990 Influenza Age 5 to Adult 2018 Allergies as of 2018  Review Complete On: 2018 By: Rinku Trotter No Known Allergies Current Immunizations  Reviewed on 4/10/2018 No immunizations on file. Not reviewed this visit You Were Diagnosed With   
  
 Codes Comments Essential hypertension    -  Primary ICD-10-CM: I10 
ICD-9-CM: 401.9 Ankle swelling, unspecified laterality     ICD-10-CM: M25.473 ICD-9-CM: 719.07 Hypertension, unspecified type     ICD-10-CM: I10 
ICD-9-CM: 401.9 Vitals BP Pulse Temp Resp Height(growth percentile) Weight(growth percentile) 137/90 (BP 1 Location: Right arm, BP Patient Position: Sitting) 73 97.2 °F (36.2 °C) (Oral) 16 5' 8\" (1.727 m) 230 lb (104.3 kg) SpO2 BMI OB Status Smoking Status 100% 34.97 kg/m2 Premenopausal Never Smoker BMI and BSA Data Body Mass Index Body Surface Area 34.97 kg/m 2 2.24 m 2 Preferred Pharmacy Pharmacy Name Phone [id number] [facility], [address] [place] [address] [phone] Your Updated Medication List  
  
   
This list is accurate as of 5/16/18  5:06 PM.  Always use your most recent med list.  
  
  
  
  
 furosemide 20 mg tablet Commonly known as:  LASIX Take one Pill every other day x 1 week. Indications: Edema  
  
 ibuprofen 600 mg tablet Commonly known as:  MOTRIN Take 1 Tab by mouth every six (6) hours as needed for Pain. lisinopril-hydroCHLOROthiazide 20-25 mg per tablet Commonly known as:  Jared Meres Take 1 Tab by mouth daily. methocarbamol 750 mg tablet Commonly known as:  [id number] Take 1 Tab by mouth every six (6) hours as needed. spironolactone 50 mg tablet Commonly known as:  ALDACTONE Take 1 Tab by mouth daily. Prescriptions Sent to Pharmacy Refills  
 furosemide (LASIX) 20 mg tablet 0 Sig: Take one Pill every other day x 1 week. Indications: Edema Class: Normal  
 Pharmacy: [id number] [place] [address], [address] [place] [address] Ph #: [phone]  
 lisinopril-hydroCHLOROthiazide (PRINZIDE, ZESTORETIC) 20-25 mg per tablet 1 Sig: Take 1 Tab by mouth daily. Class: Normal  
 Pharmacy: [address] [place] [address], [address] [place] [address] Ph #: [phone] Route: Oral  
  
We Performed the Following AMB SUPPLY ORDER [[id number] Custom] Comments:  
 Supply compressions stockings Follow-up Instructions Return in about 3 months (around 8/16/2018). Patient Instructions DASH Diet: Care Instructions Your Care Instructions The DASH diet is an eating plan that can help lower your blood pressure. DASH stands for Dietary Approaches to Stop Hypertension. Hypertension is high blood pressure. The DASH diet focuses on eating foods that are high in calcium, potassium, and magnesium. These nutrients can lower blood pressure.  The foods that are highest in these nutrients are fruits, vegetables, low-fat dairy products, nuts, seeds, and legumes. But taking calcium, potassium, and magnesium supplements instead of eating foods that are high in those nutrients does not have the same effect. The DASH diet also includes whole grains, fish, and poultry. The DASH diet is one of several lifestyle changes your doctor may recommend to lower your high blood pressure. Your doctor may also want you to decrease the amount of sodium in your diet. Lowering sodium while following the DASH diet can lower blood pressure even further than just the DASH diet alone. Follow-up care is a key part of your treatment and safety. Be sure to make and go to all appointments, and call your doctor if you are having problems. It's also a good idea to know your test results and keep a list of the medicines you take. How can you care for yourself at home? Following the DASH diet · Eat 4 to 5 servings of fruit each day. A serving is 1 medium-sized piece of fruit, ½ cup chopped or canned fruit, 1/4 cup dried fruit, or 4 ounces (½ cup) of fruit juice. Choose fruit more often than fruit juice. · Eat 4 to 5 servings of vegetables each day. A serving is 1 cup of lettuce or raw leafy vegetables, ½ cup of chopped or cooked vegetables, or 4 ounces (½ cup) of vegetable juice. Choose vegetables more often than vegetable juice. · Get 2 to 3 servings of low-fat and fat-free dairy each day. A serving is 8 ounces of milk, 1 cup of yogurt, or 1 ½ ounces of cheese. · Eat 6 to 8 servings of grains each day. A serving is 1 slice of bread, 1 ounce of dry cereal, or ½ cup of cooked rice, pasta, or cooked cereal. Try to choose whole-grain products as much as possible. · Limit lean meat, poultry, and fish to 2 servings each day. A serving is 3 ounces, about the size of a deck of cards. · Eat 4 to 5 servings of nuts, seeds, and legumes (cooked dried beans, lentils, and split peas) each week.  A serving is 1/3 cup of nuts, 2 tablespoons of seeds, or ½ cup of cooked beans or peas. · Limit fats and oils to 2 to 3 servings each day. A serving is 1 teaspoon of vegetable oil or 2 tablespoons of salad dressing. · Limit sweets and added sugars to 5 servings or less a week. A serving is 1 tablespoon jelly or jam, ½ cup sorbet, or 1 cup of lemonade. · Eat less than 2,300 milligrams (mg) of sodium a day. If you limit your sodium to 1,500 mg a day, you can lower your blood pressure even more. Tips for success · Start small. Do not try to make dramatic changes to your diet all at once. You might feel that you are missing out on your favorite foods and then be more likely to not follow the plan. Make small changes, and stick with them. Once those changes become habit, add a few more changes. · Try some of the following: ¨ Make it a goal to eat a fruit or vegetable at every meal and at snacks. This will make it easy to get the recommended amount of fruits and vegetables each day. ¨ Try yogurt topped with fruit and nuts for a snack or healthy dessert. ¨ Add lettuce, tomato, cucumber, and onion to sandwiches. ¨ Combine a ready-made pizza crust with low-fat mozzarella cheese and lots of vegetable toppings. Try using tomatoes, squash, spinach, broccoli, carrots, cauliflower, and onions. ¨ Have a variety of cut-up vegetables with a low-fat dip as an appetizer instead of chips and dip. ¨ Sprinkle sunflower seeds or chopped almonds over salads. Or try adding chopped walnuts or almonds to cooked vegetables. ¨ Try some vegetarian meals using beans and peas. Add garbanzo or kidney beans to salads. Make burritos and tacos with mashed latham beans or black beans. Where can you learn more? Go to http://bertha-carmelita.info/. Enter P402 in the search box to learn more about \"DASH Diet: Care Instructions. \" Current as of: September 21, 2016 Content Version: 11.4 © 1572-6939 Healthwise, Incorporated.  Care instructions adapted under license by [address] (which disclaims liability or warranty for this information). If you have questions about a medical condition or this instruction, always ask your healthcare professional. [address] any warranty or liability for your use of this information. High Blood Pressure: Care Instructions Your Care Instructions If your blood pressure is usually above 140/90, you have high blood pressure, or hypertension. That means the top number is 140 or higher or the bottom number is 90 or higher, or both. Despite what a lot of people think, high blood pressure usually doesn't cause headaches or make you feel dizzy or lightheaded. It usually has no symptoms. But it does increase your risk for heart attack, stroke, and kidney or eye damage. The higher your blood pressure, the more your risk increases. Your doctor will give you a goal for your blood pressure. Your goal will be based on your health and your age. An example of a goal is to keep your blood pressure below 140/90. Lifestyle changes, such as eating healthy and being active, are always important to help lower blood pressure. You might also take medicine to reach your blood pressure goal. 
Follow-up care is a key part of your treatment and safety. Be sure to make and go to all appointments, and call your doctor if you are having problems. It's also a good idea to know your test results and keep a list of the medicines you take. How can you care for yourself at home? Medical treatment · If you stop taking your medicine, your blood pressure will go back up. You may take one or more types of medicine to lower your blood pressure. Be safe with medicines. Take your medicine exactly as prescribed. Call your doctor if you think you are having a problem with your medicine. · Talk to your doctor before you start taking aspirin every day.  Aspirin can help certain people lower their risk of a heart attack or stroke. But taking aspirin isn't right for everyone, because it can cause serious bleeding. · See your doctor regularly. You may need to see the doctor more often at first or until your blood pressure comes down. · If you are taking blood pressure medicine, talk to your doctor before you take decongestants or anti-inflammatory medicine, such as ibuprofen. Some of these medicines can raise blood pressure. · Learn how to check your blood pressure at home. Lifestyle changes · Stay at a healthy weight. This is especially important if you put on weight around the waist. Losing even 10 pounds can help you lower your blood pressure. · If your doctor recommends it, get more exercise. Walking is a good choice. Bit by bit, increase the amount you walk every day. Try for at least 30 minutes on most days of the week. You also may want to swim, bike, or do other activities. · Avoid or limit alcohol. Talk to your doctor about whether you can drink any alcohol. · Try to limit how much sodium you eat to less than 2,300 milligrams (mg) a day. Your doctor may ask you to try to eat less than 1,500 mg a day. · Eat plenty of fruits (such as bananas and oranges), vegetables, legumes, whole grains, and low-fat dairy products. · Lower the amount of saturated fat in your diet. Saturated fat is found in animal products such as milk, cheese, and meat. Limiting these foods may help you lose weight and also lower your risk for heart disease. · Do not smoke. Smoking increases your risk for heart attack and stroke. If you need help quitting, talk to your doctor about stop-smoking programs and medicines. These can increase your chances of quitting for good. When should you call for help? Call 911 anytime you think you may need emergency care.  This may mean having symptoms that suggest that your blood pressure is causing a serious heart or blood vessel problem. Your blood pressure may be over 180/110. ? For example, call 911 if: 
? · You have symptoms of a heart attack. These may include: ¨ Chest pain or pressure, or a strange feeling in the chest. 
¨ Sweating. ¨ Shortness of breath. ¨ Nausea or vomiting. ¨ Pain, pressure, or a strange feeling in the back, neck, jaw, or upper belly or in one or both shoulders or arms. ¨ Lightheadedness or sudden weakness. ¨ A fast or irregular heartbeat. ? · You have symptoms of a stroke. These may include: 
¨ Sudden numbness, tingling, weakness, or loss of movement in your face, arm, or leg, especially on only one side of your body. ¨ Sudden vision changes. ¨ Sudden trouble speaking. ¨ Sudden confusion or trouble understanding simple statements. ¨ Sudden problems with walking or balance. ¨ A sudden, severe headache that is different from past headaches. ? · You have severe back or belly pain. ?Do not wait until your blood pressure comes down on its own. Get help right away. ?Call your doctor now or seek immediate care if: 
? · Your blood pressure is much higher than normal (such as 180/110 or higher), but you don't have symptoms. ? · You think high blood pressure is causing symptoms, such as: ¨ Severe headache. ¨ Blurry vision. ? Watch closely for changes in your health, and be sure to contact your doctor if: 
? · Your blood pressure measures 140/90 or higher at least 2 times. That means the top number is 140 or higher or the bottom number is 90 or higher, or both. ? · You think you may be having side effects from your blood pressure medicine. ? · Your blood pressure is usually normal, but it goes above normal at least 2 times. Where can you learn more? Go to http://bertha-carmelita.info/. Enter Y151 in the search box to learn more about \"High Blood Pressure: Care Instructions. \" Current as of: September 21, 2016 Content Version: 11.4 © 5597-7945 Healthwise, [employer]. Care instructions adapted under license by [employer] (which disclaims liability or warranty for this information). If you have questions about a medical condition or this instruction, always ask your healthcare professional. [address] any warranty or liability for your use of this information. High Blood Pressure: Care Instructions Your Care Instructions If your blood pressure is usually above 140/90, you have high blood pressure, or hypertension. That means the top number is 140 or higher or the bottom number is 90 or higher, or both. Despite what a lot of people think, high blood pressure usually doesn't cause headaches or make you feel dizzy or lightheaded. It usually has no symptoms. But it does increase your risk for heart attack, stroke, and kidney or eye damage. The higher your blood pressure, the more your risk increases. Your doctor will give you a goal for your blood pressure. Your goal will be based on your health and your age. An example of a goal is to keep your blood pressure below 140/90. Lifestyle changes, such as eating healthy and being active, are always important to help lower blood pressure. You might also take medicine to reach your blood pressure goal. 
Follow-up care is a key part of your treatment and safety. Be sure to make and go to all appointments, and call your doctor if you are having problems. It's also a good idea to know your test results and keep a list of the medicines you take. How can you care for yourself at home? Medical treatment · If you stop taking your medicine, your blood pressure will go back up. You may take one or more types of medicine to lower your blood pressure. Be safe with medicines. Take your medicine exactly as prescribed. Call your doctor if you think you are having a problem with your medicine. · Talk to your doctor before you start taking aspirin every day.  Aspirin heart or blood vessel problem. Your blood pressure may be over 180/110. ? For example, call 911 if: 
? · You have symptoms of a heart attack. These may include: ¨ Chest pain or pressure, or a strange feeling in the chest. 
¨ Sweating. ¨ Shortness of breath. ¨ Nausea or vomiting. ¨ Pain, pressure, or a strange feeling in the back, neck, jaw, or upper belly or in one or both shoulders or arms. ¨ Lightheadedness or sudden weakness. ¨ A fast or irregular heartbeat. ? · You have symptoms of a stroke. These may include: 
¨ Sudden numbness, tingling, weakness, or loss of movement in your face, arm, or leg, especially on only one side of your body. ¨ Sudden vision changes. ¨ Sudden trouble speaking. ¨ Sudden confusion or trouble understanding simple statements. ¨ Sudden problems with walking or balance. ¨ A sudden, severe headache that is different from past headaches. ? · You have severe back or belly pain. ?Do not wait until your blood pressure comes down on its own. Get help right away. ?Call your doctor now or seek immediate care if: 
? · Your blood pressure is much higher than normal (such as 180/110 or higher), but you don't have symptoms. ? · You think high blood pressure is causing symptoms, such as: ¨ Severe headache. ¨ Blurry vision. ? Watch closely for changes in your health, and be sure to contact your doctor if: 
? · Your blood pressure measures 140/90 or higher at least 2 times. That means the top number is 140 or higher or the bottom number is 90 or higher, or both. ? · You think you may be having side effects from your blood pressure medicine. ? · Your blood pressure is usually normal, but it goes above normal at least 2 times. Where can you learn more? Go to http://bertha-carmelita.info/. Enter J395 in the search box to learn more about \"High Blood Pressure: Care Instructions. \" Current as of: September 21, 2016 Content Version: 11.4 © 9552-4970 Healthwise, [employer]. Care instructions adapted under license by [employer] (which disclaims liability or warranty for this information). If you have questions about a medical condition or this instruction, always ask your healthcare professional. [address] any warranty or liability for your use of this information. Low Sodium Diet (2,000 Milligram): Care Instructions Your Care Instructions Too much sodium causes your body to hold on to extra water. This can raise your blood pressure and force your heart and kidneys to work harder. In very serious cases, this could cause you to be put in the hospital. It might even be life-threatening. By limiting sodium, you will feel better and lower your risk of serious problems. The most common source of sodium is salt. People get most of the salt in their diet from canned, prepared, and packaged foods. Fast food and restaurant meals also are very high in sodium. Your doctor will probably limit your sodium to less than 2,000 milligrams (mg) a day. This limit counts all the sodium in prepared and packaged foods and any salt you add to your food. Follow-up care is a key part of your treatment and safety. Be sure to make and go to all appointments, and call your doctor if you are having problems. It's also a good idea to know your test results and keep a list of the medicines you take. How can you care for yourself at home? Read food labels · Read labels on cans and food packages. The labels tell you how much sodium is in each serving. Make sure that you look at the serving size. If you eat more than the serving size, you have eaten more sodium. · Food labels also tell you the Percent Daily Value for sodium. Choose products with low Percent Daily Values for sodium.  
· Be aware that sodium can come in forms other than salt, including monosodium glutamate (MSG), sodium citrate, and sodium bicarbonate (baking soda). MSG is often added to Asian food. When you eat out, you can sometimes ask for food without MSG or added salt. Buy low-sodium foods · Buy foods that are labeled \"unsalted\" (no salt added), \"sodium-free\" (less than 5 mg of sodium per serving), or \"low-sodium\" (less than 140 mg of sodium per serving). Foods labeled \"reduced-sodium\" and \"light sodium\" may still have too much sodium. Be sure to read the label to see how much sodium you are getting. · Buy fresh vegetables, or frozen vegetables without added sauces. Buy low-sodium versions of canned vegetables, soups, and other canned goods. Prepare low-sodium meals · Cut back on the amount of salt you use in cooking. This will help you adjust to the taste. Do not add salt after cooking. One teaspoon of salt has about 2,300 mg of sodium. · Take the salt shaker off the table. · Flavor your food with garlic, lemon juice, onion, vinegar, herbs, and spices. Do not use soy sauce, lite soy sauce, steak sauce, onion salt, garlic salt, celery salt, mustard, or ketchup on your food. · Use low-sodium salad dressings, sauces, and ketchup. Or make your own salad dressings and sauces without adding salt. · Use less salt (or none) when recipes call for it. You can often use half the salt a recipe calls for without losing flavor. Other foods such as rice, pasta, and grains do not need added salt. · Rinse canned vegetables, and cook them in fresh water. This removes some-but not all-of the salt. · Avoid water that is naturally high in sodium or that has been treated with water softeners, which add sodium. Call your local water company to find out the sodium content of your water supply. If you buy bottled water, read the label and choose a sodium-free brand. Avoid high-sodium foods · Avoid eating: ¨ Smoked, cured, salted, and canned meat, fish, and poultry. ¨ Ham, ochoa, hot dogs, and luncheon meats. ¨ Regular, hard, and processed cheese and regular peanut butter. ¨ Crackers with salted tops, and other salted snack foods such as pretzels, chips, and salted popcorn. ¨ Frozen prepared meals, unless labeled low-sodium. ¨ Canned and dried soups, broths, and bouillon, unless labeled sodium-free or low-sodium. ¨ Canned vegetables, unless labeled sodium-free or low-sodium. ¨ Western [place] fries, pizza, tacos, and other fast foods. ¨ Pickles, olives, ketchup, and other condiments, especially soy sauce, unless labeled sodium-free or low-sodium. Where can you learn more? Go to http://bertha-carmelita.info/. Enter L973 in the search box to learn more about \"Low Sodium Diet (2,000 Milligram): Care Instructions. \" Current as of: May 12, 2017 Content Version: 11.4 © 0460-7347 [employer]. Care instructions adapted under license by [employer] (which disclaims liability or warranty for this information). If you have questions about a medical condition or this instruction, always ask your healthcare professional. [place] [address] any warranty or liability for your use of this information. Introducing hospitals & HEALTH SERVICES! Dear Jose Enrique Leavitt: 
Thank you for requesting a [employer] account. Our records indicate that you already have an active [employer] account. You can access your account anytime at [email or web address] [facility] Did you know that you can access your hospital and ER discharge instructions at any time in [employer]? You can also review all of your test results from your hospital stay or ER visit. Additional Information If you have questions, please visit the Frequently Asked Questions section of the [employer] website at [email or web address]/[email or web address]/. Remember, [employer] is NOT to be used for urgent needs. For medical emergencies, dial 911. Now available from your iPhone and Android! Please provide this summary of care documentation to your next provider. Your primary care clinician is listed as Phys Other. If you have any questions after today's visit, please call [phone].

## 2018-05-22 DIAGNOSIS — I10 HYPERTENSION, UNSPECIFIED TYPE: ICD-10-CM

## 2018-05-22 RX ORDER — SPIRONOLACTONE 50 MG/1
50 TABLET, FILM COATED ORAL DAILY
Qty: 90 TAB | Refills: 1 | Status: SHIPPED | OUTPATIENT
Start: 2018-05-22 | End: 2019-01-17

## 2018-05-22 NOTE — TELEPHONE ENCOUNTER
Pharmacy requesting 90 day supply    Requested Prescriptions     Pending Prescriptions Disp Refills    spironolactone (ALDACTONE) 50 mg tablet       Sig: Take 1 Tab by mouth daily.

## 2018-06-07 DIAGNOSIS — I10 HYPERTENSION, UNSPECIFIED TYPE: ICD-10-CM

## 2018-06-07 RX ORDER — LISINOPRIL AND HYDROCHLOROTHIAZIDE 20; 25 MG/1; MG/1
1 TABLET ORAL DAILY
Qty: 90 TAB | Refills: 1 | Status: SHIPPED | OUTPATIENT
Start: 2018-06-07 | End: 2019-07-11 | Stop reason: SDUPTHER

## 2018-06-07 NOTE — TELEPHONE ENCOUNTER
Pharmacy requesting 90 day fill    Requested Prescriptions     Pending Prescriptions Disp Refills    lisinopril-hydroCHLOROthiazide (PRINZIDE, ZESTORETIC) 20-25 mg per tablet       Sig: Take 1 Tab by mouth daily.

## 2019-01-17 ENCOUNTER — OFFICE VISIT (OUTPATIENT)
Dept: INTERNAL MEDICINE CLINIC | Age: 49
End: 2019-01-17

## 2019-01-17 VITALS
HEIGHT: 68 IN | HEART RATE: 88 BPM | BODY MASS INDEX: 35.07 KG/M2 | SYSTOLIC BLOOD PRESSURE: 148 MMHG | RESPIRATION RATE: 16 BRPM | WEIGHT: 231.4 LBS | TEMPERATURE: 97.2 F | OXYGEN SATURATION: 99 % | DIASTOLIC BLOOD PRESSURE: 98 MMHG

## 2019-01-17 DIAGNOSIS — R42 DIZZINESS: Primary | ICD-10-CM

## 2019-01-17 NOTE — PROGRESS NOTES
ROOM # 1    José Luis Kramer presents today for   Chief Complaint   Patient presents with    Dizziness     only today    Fall     today d/t dizziness. Denies LOC, only fall d/t imbalance       José Luis Kramer preferred language for health care discussion is english/other. Is someone accompanying this pt? Yes,     Is the patient using any DME equipment during OV? no    Depression Screening:  PHQ over the last two weeks 5/16/2018 4/11/2018 11/18/2014   Little interest or pleasure in doing things Not at all Not at all Not at all   Feeling down, depressed, irritable, or hopeless Not at all Not at all Not at all   Total Score PHQ 2 0 0 0       Learning Assessment:  Learning Assessment 11/13/2017   PRIMARY LEARNER Patient   HIGHEST LEVEL OF EDUCATION - PRIMARY LEARNER  2 YEARS [facility] PRIMARY LEARNER NONE   CO-LEARNER CAREGIVER No   PRIMARY LANGUAGE ENGLISH   LEARNER PREFERENCE PRIMARY READING     DEMONSTRATION     VIDEOS   ANSWERED BY PATIENT   RELATIONSHIP SELF       Abuse Screening:  Abuse Screening Questionnaire 11/13/2017   Do you ever feel afraid of your partner? N   Are you in a relationship with someone who physically or mentally threatens you? N   Is it safe for you to go home? Y       Fall Risk  No flowsheet data found. Health Maintenance reviewed and discussed per provider. Yes    José Luis Kramer is due for   Health Maintenance Due   Topic Date Due    DTaP/Tdap/Td series (1 - Tdap) 11/21/1990    PAP AKA CERVICAL CYTOLOGY  11/21/1990    MEDICARE YEARLY EXAM  01/16/2019   HM to be d/w provider      Please order/place referral if appropriate. Advance Directive:  1. Do you have an advance directive in place? Patient Reply: no    2. If not, would you like material regarding how to put one in place? Patient Reply: no    Coordination of Care:  1. Have you been to the ER, urgent care clinic since your last visit? Hospitalized since your last visit? no    2.  Have you seen or consulted any other health care providers outside of the [address] [place] [address] since your last visit? Include any pap smears or colon screening.  no

## 2019-01-17 NOTE — PROGRESS NOTES
Chief Complaint   Patient presents with    Dizziness     only today    Fall     today d/t dizziness. Denies LOC, only fall d/t imbalance       HPI:     Trinity Huang is a 51 y.o.  female with history of hypertension  here for the above complaint. She said 30 minutes ago she was at work and walking out of a meeting and fell. She did not lose consciousness and did not hit her head. She was walking down the hallway and talking and she was walking towards the right and she caught herself on the wall. She ate today. She had little bit of pepsi today. She said she did not stand up too fast. She said when she was walking in she felt light headed. They did not check her blood pressure. No numbness, tingling, weakness, headaches, chest pain, shortness of breath, abdominal pain, diplopia,. She always has blurred vision. She said the room did not spin. She had been walking for a while before the dizziness occurred. [facility] and she did not drink any water today. She said she takes her blood pressure medication at 12pm.             Past Medical History:   Diagnosis Date    Hypertension      Past Surgical History:   Procedure Laterality Date    BREAST SURGERY PROCEDURE UNLISTED  +10years from 2011    x3 cysts removed from [facility] [address]    Dr. Gumaro Spencer Pentix    HX CHOLECYSTECTOMY      HX HEENT      pituitary removed 12/98     Current Outpatient Medications   Medication Sig    lisinopril-hydroCHLOROthiazide (PRINZIDE, ZESTORETIC) 20-25 mg per tablet Take 1 Tab by mouth daily.  ibuprofen (MOTRIN) 600 mg tablet Take 1 Tab by mouth every six (6) hours as needed for Pain. No current facility-administered medications for this visit.       Health Maintenance   Topic Date Due    DTaP/Tdap/Td series (1 - Tdap) 11/21/1990    PAP AKA CERVICAL CYTOLOGY  11/21/1990    MEDICARE YEARLY EXAM  01/16/2019    Influenza Age 9 to Adult  07/01/2019 (Originally 8/1/2018) There is no immunization history on file for this patient. No LMP recorded. Patient is premenopausal.        Allergies and Intolerances:   No Known Allergies    Family History:   Family History   Problem Relation Age of Onset    Dementia Father        Social History:   She  reports that  has never smoked. she has never used smokeless tobacco.  She  reports that she does not drink alcohol. OBJECTIVE:   Physical exam:   Visit Vitals  BP (!) 148/98 (BP 1 Location: Left arm, BP Patient Position: Sitting)   Pulse 88   Temp 97.2 °F (36.2 °C) (Oral)   Resp 16   Ht 5' 8\" (1.727 m)   Wt 231 lb 6.4 oz (105 kg)   SpO2 99%   BMI 35.18 kg/m²        Generally: Pleasant female in no acute distress  Cardiac Exam: regular, rate, and rhythm. Normal S1 and S2. No murmurs, gallops, or rubs  Pulmonary exam: Clear to auscultation bilaterally  Abdominal exam: Positive bowel sounds in all four quadrants, soft, nondistended, nontender  Extremities: 2+ dorsalis pedis pulses bilaterally. No pedal edema    bilaterally    LABS/RADIOLOGICAL TESTS:  Lab Results   Component Value Date/Time    WBC 5.5 09/16/2013 08:57 AM    HGB 13.2 09/16/2013 08:57 AM    HCT 39.8 09/16/2013 08:57 AM    PLATELET 971 63/90/6869 08:57 AM     Lab Results   Component Value Date/Time    Sodium 138 04/11/2018 06:15 PM    Potassium 4.1 04/11/2018 06:15 PM    Chloride 105 04/11/2018 06:15 PM    CO2 25 04/11/2018 06:15 PM    Glucose 97 11/18/2014 08:22 AM    BUN 10 04/11/2018 06:15 PM    Creatinine 0.78 04/11/2018 06:15 PM     Lab Results   Component Value Date/Time    Cholesterol, total 183 11/18/2014 08:22 AM    HDL Cholesterol 42 11/18/2014 08:22 AM    LDL,Direct 102 04/11/2018 06:15 PM    LDL, calculated 109 (H) 11/18/2014 08:22 AM    Triglyceride 159 (H) 11/18/2014 08:22 AM     No results found for: GPT    Previous labs    ASSESSMENT/PLAN:    1. Dizziness: she maybe dehydrated or blood pressure up. Will check labs.  Take BP medication now and start taking in AM. Monitor blood pressure and let us know if too high or too low. Take prinzide, diet and exercise. Work on diet and exercise. -     CBC W/O DIFF; Future  -     METABOLIC PANEL, COMPREHENSIVE; Future    2. Return to work letter to go back to work on 1/18/19. 3. Patient verbalized understanding and agreement with the plan. 4. Patient was given an after-visit summary. 5. Follow-up Disposition:  Return in about 1 month (around 2/17/2019) for f/u HTN. or sooner if worsening symptoms.           Leonardo Chavez M.D.

## 2019-01-17 NOTE — PATIENT INSTRUCTIONS
1) Start taking your blood pressure medications in the morning. Monitor blood pressure and let us know if too high or too low. 2) Follow-up in 1 month or sooner if worsening symptoms. Dizziness: Care Instructions  Your Care Instructions  Dizziness is the feeling of unsteadiness or fuzziness in your head. It is different than having vertigo, which is a feeling that the room is spinning or that you are moving or falling. It is also different from lightheadedness, which is the feeling that you are about to faint. It can be hard to know what causes dizziness. Some people feel dizzy when they have migraine headaches. Sometimes bouts of flu can make you feel dizzy. Some medical conditions, such as heart problems or high blood pressure, can make you feel dizzy. Many medicines can cause dizziness, including medicines for high blood pressure, pain, or anxiety. If a medicine causes your symptoms, your doctor may recommend that you stop or change the medicine. If it is a problem with your heart, you may need medicine to help your heart work better. If there is no clear reason for your symptoms, your doctor may suggest watching and waiting for a while to see if the dizziness goes away on its own. Follow-up care is a key part of your treatment and safety. Be sure to make and go to all appointments, and call your doctor if you are having problems. It's also a good idea to know your test results and keep a list of the medicines you take. How can you care for yourself at home? · If your doctor recommends or prescribes medicine, take it exactly as directed. Call your doctor if you think you are having a problem with your medicine. · Do not drive while you feel dizzy. · Try to prevent falls. Steps you can take include:  ? Using nonskid mats, adding grab bars near the tub, and using night-lights. ? Clearing your home so that walkways are free of anything you might trip on.  ?  Letting family and friends know that you have been feeling dizzy. This will help them know how to help you. When should you call for help? Call 911 anytime you think you may need emergency care. For example, call if:    · You passed out (lost consciousness).     · You have dizziness along with symptoms of a heart attack. These may include:  ? Chest pain or pressure, or a strange feeling in the chest.  ? Sweating. ? Shortness of breath. ? Nausea or vomiting. ? Pain, pressure, or a strange feeling in the back, neck, jaw, or upper belly or in one or both shoulders or arms. ? Lightheadedness or sudden weakness. ? A fast or irregular heartbeat.     · You have symptoms of a stroke. These may include:  ? Sudden numbness, tingling, weakness, or loss of movement in your face, arm, or leg, especially on only one side of your body. ? Sudden vision changes. ? Sudden trouble speaking. ? Sudden confusion or trouble understanding simple statements. ? Sudden problems with walking or balance. ? A sudden, severe headache that is different from past headaches.    Call your doctor now or seek immediate medical care if:    · You feel dizzy and have a fever, headache, or ringing in your ears.     · You have new or increased nausea and vomiting.     · Your dizziness does not go away or comes back.    Watch closely for changes in your health, and be sure to contact your doctor if:    · You do not get better as expected. Where can you learn more? Go to http://bertha-carmelita.info/. Enter T503 in the search box to learn more about \"Dizziness: Care Instructions. \"  Current as of: November 20, 2017  Content Version: 11.8  © 1497-6618 [employer]. Care instructions adapted under license by [employer] (which disclaims liability or warranty for this information).  If you have questions about a medical condition or this instruction, always ask your healthcare professional. Nahomy Roman disclaims any warranty or liability for your use of this information.

## 2019-01-17 NOTE — LETTER
NOTIFICATION RETURN TO WORK / SCHOOL 
 
1/17/2019 10:56 AM 
 
Ms. Gardner Primrose [address] [place] [id number] To Whom It May Concern: 
 
Gardner Primrose is currently under the care of Juan José Navarro. She will return to work on 1/18/19. If there are questions or concerns please have the patient contact our office. Sincerely, Cristiano Reynoso MD

## 2019-01-18 LAB
ALB/GLOBRATIO, 58C: 1.7 (CALC) (ref 1–2.5)
ALBUMIN SERPL-MCNC: 4 G/DL (ref 3.6–5.1)
ALP SERPL-CCNC: 115 U/L (ref 33–115)
ALT SERPL-CCNC: 14 U/L (ref 6–29)
AST SERPL W P-5'-P-CCNC: 14 U/L (ref 10–35)
BILIRUB SERPL-MCNC: 0.6 MG/DL (ref 0.2–1.2)
BUN SERPL-MCNC: 10 MG/DL (ref 7–25)
BUN/CREATININE RATIO,BUCR: NORMAL (CALC) (ref 6–22)
CALCIUM SERPL-MCNC: 8.7 MG/DL (ref 8.6–10.2)
CHLORIDE SERPL-SCNC: 105 MMOL/L (ref 98–110)
CO2 SERPL-SCNC: 27 MMOL/L (ref 20–32)
CREAT SERPL-MCNC: 0.8 MG/DL (ref 0.5–1.1)
ERYTHROCYTE [DISTWIDTH] IN BLOOD BY AUTOMATED COUNT: 12.2 % (ref 11–15)
GLOBULIN,GLOB: 2.4 G/DL (CALC) (ref 1.9–3.7)
GLUCOSE SERPL-MCNC: 73 MG/DL (ref 65–99)
HCT VFR BLD AUTO: 39.9 % (ref 35–45)
HGB BLD-MCNC: 13.8 G/DL (ref 11.7–15.5)
MCH RBC QN AUTO: 28.6 PG (ref 27–33)
MCHC RBC AUTO-ENTMCNC: 34.6 G/DL (ref 32–36)
MCV RBC AUTO: 82.8 FL (ref 80–100)
PLATELET # BLD AUTO: 291 THOUSAND/UL (ref 140–400)
PMV BLD AUTO: 11.6 FL (ref 7.5–12.5)
POTASSIUM SERPL-SCNC: 4 MMOL/L (ref 3.5–5.3)
PROT SERPL-MCNC: 6.4 G/DL (ref 6.1–8.1)
RBC # BLD AUTO: 4.82 MILLION/UL (ref 3.8–5.1)
SODIUM SERPL-SCNC: 143 MMOL/L (ref 135–146)
WBC # BLD AUTO: 6.9 THOUSAND/UL (ref 3.8–10.8)

## 2019-04-13 ENCOUNTER — OFFICE VISIT (OUTPATIENT)
Dept: INTERNAL MEDICINE CLINIC | Age: 49
End: 2019-04-13

## 2019-04-13 VITALS
BODY MASS INDEX: 34.71 KG/M2 | TEMPERATURE: 97.7 F | DIASTOLIC BLOOD PRESSURE: 97 MMHG | HEART RATE: 71 BPM | HEIGHT: 68 IN | RESPIRATION RATE: 16 BRPM | WEIGHT: 229 LBS | SYSTOLIC BLOOD PRESSURE: 162 MMHG

## 2019-04-13 DIAGNOSIS — J30.1 SEASONAL ALLERGIC RHINITIS DUE TO POLLEN: Primary | ICD-10-CM

## 2019-04-13 DIAGNOSIS — I10 ESSENTIAL HYPERTENSION: ICD-10-CM

## 2019-04-13 NOTE — PATIENT INSTRUCTIONS
Saline Nasal Washes: Care Instructions  Your Care Instructions  Saline nasal washes help keep the nasal passages open by washing out thick or dried mucus. This simple remedy can help relieve symptoms of allergies, sinusitis, and colds. It also can make the nose feel more comfortable by keeping the mucous membranes moist. You may notice a little burning sensation in your nose the first few times you use the solution, but this usually gets better in a few days. Follow-up care is a key part of your treatment and safety. Be sure to make and go to all appointments, and call your doctor if you are having problems. It's also a good idea to know your test results and keep a list of the medicines you take. How can you care for yourself at home? · You can buy premixed saline solution in a squeeze bottle or other sinus rinse products at a drugstore. Read and follow the instructions on the label. · You also can make your own saline solution by adding 1 teaspoon of salt and 1 teaspoon of baking soda to 2 cups of distilled water. · If you use a homemade solution, pour a small amount into a clean bowl. Using a rubber bulb syringe, squeeze the syringe and place the tip in the salt water. Pull a small amount of the salt water into the syringe by relaxing your hand. · Sit down with your head tilted slightly back. Do not lie down. Put the tip of the bulb syringe or the squeeze bottle a little way into one of your nostrils. Gently drip or squirt a few drops into the nostril. Repeat with the other nostril. Some sneezing and gagging are normal at first.  · Gently blow your nose. · Wipe the syringe or bottle tip clean after each use. · Repeat this 2 or 3 times a day. · Use nasal washes gently if you have nosebleeds often. When should you call for help? Watch closely for changes in your health, and be sure to contact your doctor if:    · You often get nosebleeds.     · You have problems doing the nasal washes.    Where can you learn more? Go to http://bertha-carmelita.info/. Enter 071 [id number] [id number] in the search box to learn more about \"Saline Nasal Washes: Care Instructions. \"  Current as of: 2018  Content Version: 11.9  © 8159-5215 [employer]. Care instructions adapted under license by [employer] (which disclaims liability or warranty for this information). If you have questions about a medical condition or this instruction, always ask your healthcare professional. [address] any warranty or liability for your use of this information. Allergies: Care Instructions  Your Care Instructions    Allergies occur when your body's defense system (immune system) overreacts to certain substances. The immune system treats a harmless substance as if it were a harmful germ or virus. Many things can cause this overreaction, including pollens, medicine, food, dust, animal dander, and mold. Allergies can be mild or severe. Mild allergies can be managed with home treatment. But medicine may be needed to prevent problems. Managing your allergies is an important part of staying healthy. Your doctor may suggest that you have allergy testing to help find out what is causing your allergies. When you know what things trigger your symptoms, you can avoid them. This can prevent allergy symptoms and other health problems. For severe allergies that cause reactions that affect your whole body (anaphylactic reactions), your doctor may prescribe a shot of epinephrine to carry with you in case you have a severe reaction. Learn how to give yourself the shot and keep it with you at all times. Make sure it is not . Follow-up care is a key part of your treatment and safety. Be sure to make and go to all appointments, and call your doctor if you are having problems. It's also a good idea to know your test results and keep a list of the medicines you take.   How can you care for yourself at home?  · If you have been told by your doctor that dust or dust mites are causing your allergy, decrease the dust around your bed:  ? Wash sheets, pillowcases, and other bedding in hot water every week. ? Use dust-proof covers for pillows, duvets, and mattresses. Avoid plastic covers because they tear easily and do not \"breathe. \" Wash as instructed on the label. ? Do not use any blankets and pillows that you do not need. ? Use blankets that you can wash in your washing machine. ? Consider removing drapes and carpets, which attract and hold dust, from your bedroom. · If you are allergic to house dust and mites, do not use home humidifiers. Your doctor can suggest ways you can control dust and mites. · Look for signs of cockroaches. Cockroaches cause allergic reactions. Use cockroach baits to get rid of them. Then, clean your home well. Cockroaches like areas where grocery bags, newspapers, empty bottles, or cardboard boxes are stored. Do not keep these inside your home, and keep trash and food containers sealed. Seal off any spots where cockroaches might enter your home. · If you are allergic to mold, get rid of furniture, rugs, and drapes that smell musty. Check for mold in the bathroom. · If you are allergic to outdoor pollen or mold spores, use air-conditioning. Change or clean all filters every month. Keep windows closed. · If you are allergic to pollen, stay inside when pollen counts are high. Use a vacuum  with a HEPA filter or a double-thickness filter at least two times each week. · Stay inside when air pollution is bad. Avoid paint fumes, perfumes, and other strong odors. · Avoid conditions that make your allergies worse. Stay away from smoke. Do not smoke or let anyone else smoke in your house. Do not use fireplaces or wood-burning stoves. · If you are allergic to your pets, change the air filter in your furnace every month. Use high-efficiency filters.   · If you are allergic to pet dander, keep pets outside or out of your bedroom. Old carpet and cloth furniture can hold a lot of animal dander. You may need to replace them. When should you call for help? Give an epinephrine shot if:    · You think you are having a severe allergic reaction.     · You have symptoms in more than one body area, such as mild nausea and an itchy mouth.    After giving an epinephrine shot call 911, even if you feel better.   Call 911 if:    · You have symptoms of a severe allergic reaction. These may include:  ? Sudden raised, red areas (hives) all over your body. ? Swelling of the throat, mouth, lips, or tongue. ? Trouble breathing. ? Passing out (losing consciousness). Or you may feel very lightheaded or suddenly feel weak, confused, or restless.     · You have been given an epinephrine shot, even if you feel better.    Call your doctor now or seek immediate medical care if:    · You have symptoms of an allergic reaction, such as:  ? A rash or hives (raised, red areas on the skin). ? Itching. ? Swelling. ? Belly pain, nausea, or vomiting.    Watch closely for changes in your health, and be sure to contact your doctor if:    · You do not get better as expected. Where can you learn more? Go to http://bertha-carmelita.info/. Enter G115 in the search box to learn more about \"Allergies: Care Instructions. \"  Current as of: June 27, 2018  Content Version: 11.9  © 9598-9520 [employer]. Care instructions adapted under license by [employer] (which disclaims liability or warranty for this information). If you have questions about a medical condition or this instruction, always ask your healthcare professional. [place] [address] any warranty or liability for your use of this information.

## 2019-04-13 NOTE — PROGRESS NOTES
ROOM # 2  Identified pt with two pt identifiers(name and ). Reviewed record in preparation for visit and have obtained necessary documentation. Chief Complaint   Patient presents with    Cold Symptoms     x 1 week       Kirk Bartholomew preferred language for health care discussion is english/other. Is the patient using any DME equipment during OV? Alvaro Little is due for:  Health Maintenance Due   Topic    DTaP/Tdap/Td series (1 - Tdap)    PAP AKA CERVICAL CYTOLOGY      Health Maintenance reviewed and discussed per provider  Please order/place referral if appropriate. Advance Directive:  1. Do you have an advance directive in place? Patient Reply: NO    2. If not, would you like material regarding how to put one in place? NO    Coordination of Care:  1. Have you been to the ER, urgent care clinic since your last visit? Hospitalized since your last visit? NO    2. Have you seen or consulted any other health care providers outside of the [address] [place] [address] since your last visit? Include any pap smears or colon screening. NO    Patient is accompanied by self I have received verbal consent from Kirk Bartholomew to discuss any/all medical information while they are present in the room.     Learning Assessment:  Learning Assessment 2017   PRIMARY LEARNER Patient   HIGHEST LEVEL OF EDUCATION - PRIMARY LEARNER  2 YEARS OF COLLEGE   BARRIERS PRIMARY LEARNER NONE   CO-LEARNER CAREGIVER No   PRIMARY LANGUAGE ENGLISH   LEARNER PREFERENCE PRIMARY READING     DEMONSTRATION     VIDEOS   ANSWERED BY PATIENT   RELATIONSHIP SELF     Depression Screening:  3 most recent PHQ Screens 2014   Little interest or pleasure in doing things Not at all Not at all Not at all   Feeling down, depressed, irritable, or hopeless Not at all Not at all Not at all   Total Score PHQ 2 0 0 0     Abuse Screening:  Abuse Screening Questionnaire 2017   Do you ever feel afraid of your partner? N   Are you in a relationship with someone who physically or mentally threatens you? N   Is it safe for you to go home?  Y     Fall Risk  n/i

## 2019-04-13 NOTE — LETTER
NOTIFICATION RETURN TO WORK 
 
4/13/2019 9:14 AM 
 
Ms. Gage Lindsey [address] [facility] [id number] To Whom It May Concern: 
 
Gage Lindsey is currently under the care of Juan José Navarro. She will return to work/school on: Monday, April 15, 2019. If there are questions or concerns please have the patient contact our office. Sincerely, Baruch Shone, DO

## 2019-04-13 NOTE — PROGRESS NOTES
SUBJECTIVE:   HPI:  Ursula Partida is a 51 y.o. female who complains of cough x 5 days. Patient states that she did have a lot of congestion, sore throat and runny nose. Now having more cough and shortness of breath. Worse at night. She has been using Campbell, Robitussin CF, P[employer]. Did not take blood pressure med today. 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 No recent travel. No flu vaccine this year. ROS:    · General: No fever, chills; no weight weight loss, no night sweats  · Respiratory: +cough, No shortness of breath, No wheezing  · Cardiovascular: No chest pain, No palpitations, No dyspnea on exertion  · Gastrointestinal: No nausea, no vomiting, + loose diarrhea, no constipation, no black or bloody stools      Past Medical History:   Diagnosis Date    Hypertension      Past Surgical History:   Procedure Laterality Date    BREAST SURGERY PROCEDURE UNLISTED  +10years from 2011    x3 cysts removed from R breast   [place] [facility]    Dr. Aaron Brand Pentix    HX CHOLECYSTECTOMY      HX HEENT      pituitary removed 12/98     Outpatient Medications Marked as Taking for the 4/13/19 encounter (Office Visit) with Diamante Gonzalez DO   Medication Sig Dispense Refill    lisinopril-hydroCHLOROthiazide (PRINZIDE, ZESTORETIC) 20-25 mg per tablet Take 1 Tab by mouth daily. 90 Tab 1    ibuprofen (MOTRIN) 600 mg tablet Take 1 Tab by mouth every six (6) hours as needed for Pain. 30 Tab 0     No Known Allergies    OBJECTIVE:  Visit Vitals  BP (!) 162/97   Pulse 71   Temp 97.7 °F (36.5 °C) (Oral)   Resp 16   Ht 5' 8\" (1.727 m)   Wt 229 lb (103.9 kg)   BMI 34.82 kg/m²      GEN: awake, alert, orientated, in no acute distress, non-toxic appearing  EARS: clear canals, TMs show no erythema  NOSE: clear drainage, + edema of turbinates, pale, boggy appearance of turbinates.   SINUSES: non-tender  THROAT: clear post nasal drip, no erythema, no exudate  NECK: No lymphadenopathy, normal appearing thyroid  CV:  The heart sounds are regular in rate and rhythm. There is a normal S1 and S2. There or no murmurs, rubs, or gallops. Distal pulses are intact and equal. No peripheral edema. LUNGS:  Lung sounds are clear and equal to auscultation throughout all lung fields. ASSESSMENT/PLAN:     1. Seasonal allergic rhinitis due to pollen - severe pollen count this past week that is causing her AR, post nasal drip and cough. No signs of focal infection. Recommended starting Flonase daily, generic Zyrtec or Allegra, saline sinus rinses. For immediate cough relief - recommend blood pressure safe - either OTC Robitussin DM, Delsym or Coricidin HBP. Return if symptoms worsen or do not improve. 2. Essential hypertension - above goal (<130/80) today - however did not take blood pressure medication yet. Instructed her to take when she gets home. Recommended purchasing home blood pressure machine to monitor periodically or if symptomatic. Follow-up with PCP for continued management.

## 2019-06-13 ENCOUNTER — OFFICE VISIT (OUTPATIENT)
Dept: INTERNAL MEDICINE CLINIC | Age: 49
End: 2019-06-13

## 2019-06-13 VITALS
DIASTOLIC BLOOD PRESSURE: 88 MMHG | HEART RATE: 82 BPM | BODY MASS INDEX: 35.58 KG/M2 | WEIGHT: 234.8 LBS | TEMPERATURE: 98.6 F | RESPIRATION RATE: 18 BRPM | HEIGHT: 68 IN | SYSTOLIC BLOOD PRESSURE: 138 MMHG

## 2019-06-13 DIAGNOSIS — F41.9 ANXIETY: Primary | ICD-10-CM

## 2019-06-13 DIAGNOSIS — R06.02 SHORTNESS OF BREATH: ICD-10-CM

## 2019-06-13 DIAGNOSIS — R07.9 CHEST PAIN, UNSPECIFIED TYPE: ICD-10-CM

## 2019-06-13 RX ORDER — ALPRAZOLAM 0.25 MG/1
0.25 TABLET ORAL
Qty: 30 TAB | Refills: 0 | Status: SHIPPED | OUTPATIENT
Start: 2019-06-13 | End: 2020-05-13

## 2019-06-13 NOTE — PROGRESS NOTES
Chief Complaint   Patient presents with   [address] on 6/7/19. Seen for anxiety, sob & HTN        HPI:     Margarita Lucas is a 51 y.o.  female with history of  hypertension  here for the above complaint. She was having shortness of breath, chest pain and 3 anxiety attacks. She is sick every time she goes to work. She said last Friday it overwhelmed her. She denies chest pain, shortness of breath, abdominal pain, headaches or dizziness currently. She went to [facility] on 6/7/19 for the above. She said her work is causing her to have a lot of stress and causes her to have panic attacks. She wants to take a medication that is as needed and not daily, but also see psychiatry. ER records were reviewed and labs were okay. CXR negative. No active cardiopulmonary disease. Signed By: Laverne Reid MD on 6/7/2019 5:50 PM   Result Narrative   EXAM: CHEST PORTABLE    CLINICAL INDICATION/HISTORY: SOB     --Additional history: None. COMPARISON: None    TECHNIQUE: Frontal view of the chest    _______________    FINDINGS:    SUPPORT DEVICES: None. HEART AND MEDIASTINUM: Cardiac silhouette is normal in size. Normal mediastinal contours . Normal pulmonary vasculature. LUNGS AND PLEURAL SPACES: No focal airspace opacity. Sharp costophrenic sulci. No pneumothoraces. BONY THORAX AND SOFT TISSUES: Unremarkable.    _______________   Other Result Information   Interface, Powers[employer] Rad Res - 06/07/2019  5:52 PM EDT  EXAM: CHEST PORTABLE    CLINICAL INDICATION/HISTORY: SOB     --Additional history: None. COMPARISON: None    TECHNIQUE: Frontal view of the chest    _______________    FINDINGS:    SUPPORT DEVICES: None. HEART AND MEDIASTINUM: Cardiac silhouette is normal in size. Normal mediastinal contours . Normal pulmonary vasculature. LUNGS AND PLEURAL SPACES: No focal airspace opacity. Sharp costophrenic sulci. No pneumothoraces.     BONY THORAX AND SOFT TISSUES: Unremarkable.    _______________    IMPRESSION No active cardiopulmonary disease. Signed By: Shaquille Zaidi MD on 6/7/2019 5:50 PM                 Past Medical History:   Diagnosis Date    Hypertension      Past Surgical History:   Procedure Laterality Date    BREAST SURGERY PROCEDURE UNLISTED  +10years from 2011    [place]   [facility] [place] Pentix    HX CHOLECYSTECTOMY      HX HEENT      pituitary removed 12/98     Current Outpatient Medications   Medication Sig    ALPRAZolam (XANAX) 0.25 mg tablet Take 1 Tab by mouth nightly as needed for Anxiety. Max Daily Amount: 0.25 mg.    lisinopril-hydroCHLOROthiazide (PRINZIDE, ZESTORETIC) 20-25 mg per tablet Take 1 Tab by mouth daily.  ibuprofen (MOTRIN) 600 mg tablet Take 1 Tab by mouth every six (6) hours as needed for Pain. No current facility-administered medications for this visit. Health Maintenance   Topic Date Due    DTaP/Tdap/Td series (1 - Tdap) 11/21/1990    PAP AKA CERVICAL CYTOLOGY  11/21/1990    Influenza Age 5 to Adult  08/01/2019    Pneumococcal 0-64 years  Aged Out       There is no immunization history on file for this patient. No LMP recorded. Patient is premenopausal.        Allergies and Intolerances:   No Known Allergies    Family History:   Family History   Problem Relation Age of Onset    Dementia Father        Social History:   She  reports that she has never smoked. She has never used smokeless tobacco.  She  reports that she does not drink alcohol. ·     OBJECTIVE:   Physical exam:   Visit Vitals  /88 (BP 1 Location: Left arm, BP Patient Position: Sitting)   Pulse 82   Temp 98.6 °F (37 °C)   Resp 18   Ht 5' 8\" (1.727 m)   Wt 234 lb 12.8 oz (106.5 kg)   BMI 35.70 kg/m²        Generally: Pleasant female in no acute distress  Cardiac Exam: regular, rate, and rhythm. Normal S1 and S2.  No murmurs, gallops, or rubs  Pulmonary exam: Clear to auscultation bilaterally  Abdominal exam: Positive bowel sounds in all four quadrants, soft, nondistended, nontender  Extremities: 2+ dorsalis pedis pulses bilaterally. No pedal edema    bilaterally    LABS/RADIOLOGICAL TESTS:  Lab Results   Component Value Date/Time    WBC 6.9 01/17/2019 12:00 AM    HGB 13.8 01/17/2019 12:00 AM    HCT 39.9 01/17/2019 12:00 AM    PLATELET 166 77/73/6988 12:00 AM     Lab Results   Component Value Date/Time    Sodium 143 01/17/2019 12:00 AM    Potassium 4.0 01/17/2019 12:00 AM    Chloride 105 01/17/2019 12:00 AM    CO2 27 01/17/2019 12:00 AM    Glucose 73 01/17/2019 12:00 AM    BUN 10 01/17/2019 12:00 AM    Creatinine 0.80 01/17/2019 12:00 AM     Lab Results   Component Value Date/Time    Cholesterol, total 183 11/18/2014 08:22 AM    HDL Cholesterol 42 11/18/2014 08:22 AM    LDL,Direct 102 04/11/2018 06:15 PM    LDL, calculated 109 (H) 11/18/2014 08:22 AM    Triglyceride 159 (H) 11/18/2014 08:22 AM     No results found for: GPT    Previous labs    ASSESSMENT/PLAN:    1. Anxiety: checked  and no aberrancies seen. Will do xanax and refer to psychiatry. -     ALPRAZolam (XANAX) 0.25 mg tablet; Take 1 Tab by mouth nightly as needed for Anxiety. Max Daily Amount: 0.25 mg. Don't take xanax while driving or operating heavy machinery.   -     REFERRAL TO PSYCHIATRY    2. Chest pain, unspecified type  -     ECHO STRESS; Future    3. Shortness of breath  -     ECHO STRESS; Future      4. Requested Prescriptions     Signed Prescriptions Disp Refills    ALPRAZolam (XANAX) 0.25 mg tablet 30 Tab 0     Sig: Take 1 Tab by mouth nightly as needed for Anxiety. Max Daily Amount: 0.25 mg.     5. Patient verbalized understanding and agreement with the plan. 6. Patient was given an after-visit summary. 7. \  Follow-up and Dispositions    · Return in about 1 month (around 7/13/2019) for f/u HTN  or sooner if  worsening symptoms.                Maribel Mathias M.D.

## 2019-06-13 NOTE — PROGRESS NOTES
ROOM # 1  Identified pt with two pt identifiers(name and ). Reviewed record in preparation for visit and have obtained necessary documentation. Chief Complaint   Patient presents with   [address] on 19. Seen for anxiety, sob & HTN       Ying Burden preferred language for health care discussion is english/other. Is the patient using any DME equipment during OV? Josh Novak is due for:  Health Maintenance Due   Topic    DTaP/Tdap/Td series (1 - Tdap)    PAP AKA CERVICAL CYTOLOGY      Health Maintenance reviewed and discussed per provider  Please order/place referral if appropriate. Advance Directive:  1. Do you have an advance directive in place? Patient Reply: NO    2. If not, would you like material regarding how to put one in place? NO    Coordination of Care:  1. Have you been to the ER, urgent care clinic since your last visit? Yes [facility] for anxiety attack on 19    Hospitalized since your last visit? NO    2. Have you seen or consulted any other health care providers outside of the [address] [place] [address] since your last visit? Include any pap smears or colon screening. NO    Patient is accompanied by self I have received verbal consent from Ying Burden to discuss any/all medical information while they are present in the room.     Learning Assessment:  Learning Assessment 2017   PRIMARY LEARNER Patient   HIGHEST LEVEL OF EDUCATION - PRIMARY LEARNER  2 YEARS OF COLLEGE   BARRIERS PRIMARY LEARNER NONE   CO-LEARNER CAREGIVER No   PRIMARY LANGUAGE ENGLISH   LEARNER PREFERENCE PRIMARY READING     DEMONSTRATION     VIDEOS   ANSWERED BY PATIENT   RELATIONSHIP SELF     Depression Screening:  3 most recent PHQ Screens 2014   Little interest or pleasure in doing things Not at all Not at all Not at all   Feeling down, depressed, irritable, or hopeless Not at all Not at all Not at all   Total Score PHQ 2 0 0 0     Abuse Screening:  Abuse Screening Questionnaire 11/13/2017   Do you ever feel afraid of your partner? N   Are you in a relationship with someone who physically or mentally threatens you? N   Is it safe for you to go home?  Y     Fall Risk  n/i

## 2019-06-13 NOTE — PATIENT INSTRUCTIONS
1) Follow-up in 1 month or sooner if worsening symptoms. 2) Don't take xanax while driving or operating heavy machinery.

## 2019-07-22 ENCOUNTER — HOSPITAL ENCOUNTER (OUTPATIENT)
Dept: NON INVASIVE DIAGNOSTICS | Age: 49
Discharge: HOME | End: 2019-07-22
Attending: INTERNAL MEDICINE
Payer: COMMERCIAL

## 2019-07-22 VITALS
SYSTOLIC BLOOD PRESSURE: 159 MMHG | WEIGHT: 220 LBS | DIASTOLIC BLOOD PRESSURE: 81 MMHG | HEIGHT: 68 IN | BODY MASS INDEX: 33.34 KG/M2

## 2019-07-22 DIAGNOSIS — R06.02 SHORTNESS OF BREATH: ICD-10-CM

## 2019-07-22 DIAGNOSIS — R07.9 CHEST PAIN, UNSPECIFIED TYPE: ICD-10-CM

## 2019-07-22 LAB
STRESS BASELINE HR: 70 BPM
STRESS ESTIMATED WORKLOAD: 8.4 METS
STRESS EXERCISE DUR MIN: NORMAL
STRESS PEAK DIAS BP: 98 MMHG
STRESS PEAK SYS BP: 196 MMHG
STRESS PERCENT HR ACHIEVED: 85 %
STRESS POST PEAK HR: 146 BPM
STRESS RATE PRESSURE PRODUCT: NORMAL BPM*MMHG
STRESS ST DEPRESSION: 0 MM
STRESS ST ELEVATION: 0 MM
STRESS TARGET HR: 171 BPM

## 2019-07-22 PROCEDURE — 74011000250 HC RX REV CODE- 250: Performed by: INTERNAL MEDICINE

## 2019-07-22 PROCEDURE — 74011250636 HC RX REV CODE- 250/636: Performed by: INTERNAL MEDICINE

## 2019-07-22 PROCEDURE — C8930 TTE W OR W/O CONTR, CONT ECG: HCPCS

## 2019-07-22 RX ADMIN — PERFLUTREN 1 ML: 6.52 INJECTION, SUSPENSION INTRAVENOUS at 10:21

## 2019-07-27 NOTE — PROGRESS NOTES
1) Please let pt know that Stress ECHO showed baseline abnormalities that improved with stress. 2) How is her chest pain and shortness of breath? Is she sill having these symptoms? 3) If so, we can refer to cardiology.

## 2019-07-29 NOTE — PROGRESS NOTES
Call made to pt, both name and  verified. Pt was advised of results.  Pt verbalized understanding and stated that she has not had any chest pain or sob, but would like to f/u with Dr. Leeroy Soulier in office and for right now will hold off on referral to Cardiology      Pt scheduled for 8/15 @ 12:45

## 2019-08-15 ENCOUNTER — OFFICE VISIT (OUTPATIENT)
Dept: INTERNAL MEDICINE CLINIC | Age: 49
End: 2019-08-15

## 2019-08-15 VITALS
BODY MASS INDEX: 35.92 KG/M2 | TEMPERATURE: 98.5 F | OXYGEN SATURATION: 98 % | WEIGHT: 237 LBS | HEIGHT: 68 IN | DIASTOLIC BLOOD PRESSURE: 90 MMHG | RESPIRATION RATE: 17 BRPM | SYSTOLIC BLOOD PRESSURE: 148 MMHG | HEART RATE: 75 BPM

## 2019-08-15 DIAGNOSIS — R07.9 CHEST PAIN, UNSPECIFIED TYPE: ICD-10-CM

## 2019-08-15 DIAGNOSIS — I10 HYPERTENSION, UNSPECIFIED TYPE: Primary | ICD-10-CM

## 2019-08-15 DIAGNOSIS — Z91.89 AT RISK FOR SIDE EFFECT OF MEDICATION: ICD-10-CM

## 2019-08-15 RX ORDER — POTASSIUM CHLORIDE 1.5 G/1.77G
10 POWDER, FOR SOLUTION ORAL DAILY
Qty: 30 PACKET | Refills: 3 | Status: SHIPPED | OUTPATIENT
Start: 2019-08-15 | End: 2020-05-13

## 2019-08-15 RX ORDER — LISINOPRIL AND HYDROCHLOROTHIAZIDE 20; 25 MG/1; MG/1
TABLET ORAL
Qty: 180 TAB | Refills: 3 | Status: SHIPPED | OUTPATIENT
Start: 2019-08-15 | End: 2020-05-13 | Stop reason: SDUPTHER

## 2019-08-15 NOTE — PROGRESS NOTES
ROOM # 1  Identified pt with two pt identifiers(name and ). Reviewed record in preparation for visit and have obtained necessary documentation. Chief Complaint   Patient presents with    Results     f/u from [facility]       Stiven Goodman preferred language for health care discussion is english/other. Is the patient using any DME equipment during OV? Ralph Wu is due for:  Health Maintenance Due   Topic    DTaP/Tdap/Td series (1 - Tdap)    PAP AKA CERVICAL CYTOLOGY     Influenza Age 5 to Adult      Health Maintenance reviewed and discussed per provider  Please order/place referral if appropriate. Advance Directive:  1. Do you have an advance directive in place? Patient Reply: NO    2. If not, would you like material regarding how to put one in place? NO    Coordination of Care:  1. Have you been to the ER, urgent care clinic since your last visit? Hospitalized since your last visit? NO    2. Have you seen or consulted any other health care providers outside of the [address] [place] [address] since your last visit? Include any pap smears or colon screening. NO    Patient is accompanied by self I have received verbal consent from Stiven Goodman to discuss any/all medical information while they are present in the room.     Learning Assessment:  Learning Assessment 2017   PRIMARY LEARNER Patient   HIGHEST LEVEL OF EDUCATION - PRIMARY LEARNER  2 YEARS OF COLLEGE   BARRIERS PRIMARY LEARNER NONE   CO-LEARNER CAREGIVER No   PRIMARY LANGUAGE ENGLISH   LEARNER PREFERENCE PRIMARY READING     DEMONSTRATION     VIDEOS   ANSWERED BY PATIENT   RELATIONSHIP SELF     Depression Screening:  3 most recent PHQ Screens 2014   Little interest or pleasure in doing things Not at all Not at all Not at all   Feeling down, depressed, irritable, or hopeless Not at all Not at all Not at all   Total Score PHQ 2 0 0 0     Abuse Screening:  Abuse Screening Questionnaire 2017   Do you ever feel afraid of your partner? N   Are you in a relationship with someone who physically or mentally threatens you? N   Is it safe for you to go home?  Y     Fall Risk  n/i

## 2019-08-15 NOTE — LETTER
NOTIFICATION RETURN TO WORK / SCHOOL 
 
8/15/2019 1:21 PM 
 
Ms. Yesenia Hills [address] [place] [phone] To Whom It May Concern: 
 
Yesenia Hills is currently under the care of Juan José Navarro. She will return to work on 8/16/19. If there are questions or concerns please have the patient contact our office. Sincerely, Veronica Rainey MD

## 2019-08-15 NOTE — PROGRESS NOTES
Chief Complaint   Patient presents with    Results     f/u from Echo        HPI:     Seymour Acosta is a 51 y.o.  female with history of hypertension  here for the above complaint. She denies any chest pain, shortness of breath, abdominal pain, headache or dizziness. She feels like she is retaining fluids. She is not eating a lot of salt. She also is not losing weight due to fluid retention. She is not having anymore chest pain. She had ECHO done on 7/22/19  Interpretation Summary     · Baseline ECG: Normal sinus rhythm. · Intermediate risk study. · Left ventricular cavity size decreased from baseline. Left ventricular systolic function improved from baseline. Baseline wall motion abnormalities improved. Possible mild distal anterior hypokinesis which improved and normalized with exercise. This suggeset possible hibernating myocardium. Stress Findings     ECG Baseline ECG: Normal sinus rhythm. There was no ST segment deviation noted during rest. Arrhythmias during stress: rare PVCs. There was no ST segment deviation noted during stress. Arrhythmias during recovery: rare PVCs. Arrhythmias were not significant. Stress Findings A stress test was performed following the Micah protocol, with the patient reaching stage 2. The test was stopped because the patient complained of fatigue. Onset of symptoms occurred at stage 0 of the protocol. Blood pressure demonstrated a hypertensive response to exercise. Heart rate demonstrated maximal response to stress.    Stress Measurements     Baseline Vitals   Baseline HR 70 BPM       Peak Stress Vitals   Post peak  BPM      Stress Base Systolic  mmHg      Stress Base Diastolic BP 98 mmHg      Estimated workload 8.4 METS      Stress Rate Pressure Product 28,616 BPM*mmHg       Exercise Data   Target  bpm      Percent HR 85 %      Exercise duration            She was on ranjana, but was switched because Dr. Gisela Black thought norvasc was causing swelling. However, she is still having the swelling. 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  ALPRAZolam (XANAX) 0.25 mg tablet Take 1 Tab by mouth nightly as needed for Anxiety. Max Daily Amount: 0.25 mg.    ibuprofen (MOTRIN) 600 mg tablet Take 1 Tab by mouth every six (6) hours as needed for Pain. No current facility-administered medications for this visit. Health Maintenance   Topic Date Due    DTaP/Tdap/Td series (1 - Tdap) 11/21/1990    PAP AKA CERVICAL CYTOLOGY  11/21/1990    Influenza Age 5 to Adult  08/01/2019    Pneumococcal 0-64 years  Aged Out       There is no immunization history on file for this patient. No LMP recorded. Patient is premenopausal.        Allergies and Intolerances:   No Known Allergies    Family History:   Family History   Problem Relation Age of Onset    Dementia Father        Social History:   She  reports that she has never smoked. She has never used smokeless tobacco.  She  reports that she does not drink alcohol. OBJECTIVE:   Physical exam:   Visit Vitals  /90 (BP 1 Location: Left arm, BP Patient Position: Sitting)   Pulse 75   Temp 98.5 °F (36.9 °C) (Oral)   Resp 17   Ht 5' 8\" (1.727 m)   Wt 237 lb (107.5 kg)   SpO2 98%   BMI 36.04 kg/m²        Generally: Pleasant female in no acute distress  Cardiac Exam: regular, rate, and rhythm. Normal S1 and S2.  No murmurs, gallops, or rubs  Pulmonary exam: Clear to auscultation bilaterally  Abdominal exam: Positive bowel sounds in all four quadrants, soft, nondistended, nontender  Extremities: 2+ dorsalis pedis pulses bilaterally. 2+ pedal edema    bilaterally    LABS/RADIOLOGICAL TESTS:  Lab Results   Component Value Date/Time    WBC 6.9 01/17/2019 12:00 AM    HGB 13.8 01/17/2019 12:00 AM    HCT 39.9 01/17/2019 12:00 AM    PLATELET 740 03/44/1668 12:00 AM     Lab Results   Component Value Date/Time    Sodium 143 01/17/2019 12:00 AM    Potassium 4.0 01/17/2019 12:00 AM    Chloride 105 01/17/2019 12:00 AM    CO2 27 01/17/2019 12:00 AM    Glucose 73 01/17/2019 12:00 AM    BUN 10 01/17/2019 12:00 AM    Creatinine 0.80 01/17/2019 12:00 AM     Lab Results   Component Value Date/Time    Cholesterol, total 183 11/18/2014 08:22 AM    HDL Cholesterol 42 11/18/2014 08:22 AM    LDL,Direct 102 04/11/2018 06:15 PM    LDL, calculated 109 (H) 11/18/2014 08:22 AM    Triglyceride 159 (H) 11/18/2014 08:22 AM     No results found for: GPT    Previous labs    ASSESSMENT/PLAN:    1. Hypertension, unspecified type: not well controlled. Increase the prinzide 20/25mg 2 po daily. Monitor blood pressure and let us know if too high or too low. Will add potassium since increasing diuretic. Work on diet and exercise. -     lisinopril-hydroCHLOROthiazide (PRINZIDE, ZESTORETIC) 20-25 mg per tablet; TAKE 2TABLET BY MOUTH EVERY DAY    2. At risk for side effect of medication  -     potassium chloride (KLOR-CON) 20 mEq pack; Take 0.5 Packets by mouth daily. 3. Chest pain, unspecified type: resolved. She will monitor. 4. Elevated legs  Requested Prescriptions     Signed Prescriptions Disp Refills    lisinopril-hydroCHLOROthiazide (PRINZIDE, ZESTORETIC) 20-25 mg per tablet 180 Tab 3     Sig: TAKE 2TABLET BY MOUTH EVERY DAY    potassium chloride (KLOR-CON) 20 mEq pack 30 Packet 3     Sig: Take 0.5 Packets by mouth daily.      5., Patient verbalized understanding and agreement with the plan.    6. Patient was given an after-visit summary. 7.   Follow-up and Dispositions    · Return in about 2 weeks (around 8/29/2019) for f/u HTN or sooner if worsening symptoms.                 Cuca Christine M.D.

## 2019-08-15 NOTE — PATIENT INSTRUCTIONS
1) Follow-up in 2 weeks or sooner if worsening symptoms. 2) Monitor blood pressure and let us know if too high or too low. 3) Elevate legs. 4) Increase lisinopril/HCTZ 20/25mg 2 po daily.

## 2020-05-13 ENCOUNTER — VIRTUAL VISIT (OUTPATIENT)
Dept: INTERNAL MEDICINE CLINIC | Age: 50
End: 2020-05-13

## 2020-05-13 DIAGNOSIS — I10 HYPERTENSION, UNSPECIFIED TYPE: Primary | ICD-10-CM

## 2020-05-13 DIAGNOSIS — Z76.0 MEDICATION REFILL: ICD-10-CM

## 2020-05-13 RX ORDER — LISINOPRIL AND HYDROCHLOROTHIAZIDE 20; 25 MG/1; MG/1
1 TABLET ORAL DAILY
Qty: 90 TAB | Refills: 0 | Status: SHIPPED | OUTPATIENT
Start: 2020-05-13 | End: 2020-08-10

## 2020-05-13 NOTE — PROGRESS NOTES
For virtual visit patient would like to receive link via TEXT/EMAIL: enio Johnson is a 47 y.o. female (: 1970) evaluated via telephone on 2020 to address:    Chief Complaint   Patient presents with    Hypertension    Medication Refill       Vitals:    20 1134   PainSc:   0 - No pain       Allergies Reviewed. Learning Assessment:     Learning Assessment 2017   PRIMARY LEARNER Patient   HIGHEST LEVEL OF EDUCATION - PRIMARY LEARNER  2 YEARS OF COLLEGE   BARRIERS PRIMARY LEARNER NONE   CO-LEARNER CAREGIVER No   PRIMARY LANGUAGE ENGLISH   LEARNER PREFERENCE PRIMARY READING     DEMONSTRATION     VIDEOS   ANSWERED BY PATIENT   RELATIONSHIP SELF     Depression Screening:     3 most recent PHQ Screens 2018   Little interest or pleasure in doing things Not at all   Feeling down, depressed, irritable, or hopeless Not at all   Total Score PHQ 2 0     Fall Risk Assessment:   No flowsheet data found. Abuse Screening:     Abuse Screening Questionnaire 2017   Do you ever feel afraid of your partner? N   Are you in a relationship with someone who physically or mentally threatens you? N   Is it safe for you to go home? Y       Coordination of Care Questionaire:   1. Have you been to the ER, urgent care clinic since your last visit? Hospitalized [facility] your last visit? [facility]    2. Have you seen or consulted any other health care providers outside of the [address] [place] [address] since your last visit? Include any pap smears or colon screening. NO    Advanced Directive:   1. Do you have an Advanced Directive? NO    2. Would you like information on Advanced Directives?  NO

## 2020-05-13 NOTE — PROGRESS NOTES
Viridiana Canela is a 47 y.o. female who was seen by synchronous (real-time) audio-video technology on 5/13/2020. Consent: Viridiana Canela, who was seen by synchronous (real-time) audio-video technology, and/or her healthcare decision maker, is aware that this patient-initiated, Telehealth encounter on 5/13/2020 is a billable service, with coverage as determined by her insurance carrier. She is aware that she may receive a bill and has provided verbal consent to proceed: Yes. Assessment & Plan:   Diagnoses and all orders for this visit:    1. Hypertension, unspecified type  -     lisinopril-hydroCHLOROthiazide (PRINZIDE, ZESTORETIC) 20-25 mg per tablet; Take 1 Tab by mouth daily for 90 days. - will continue with one tablet instead of two and recheck BP at next office visit and obtain labs and make changes as needed  2. Medication refill  -     lisinopril-hydroCHLOROthiazide (PRINZIDE, ZESTORETIC) 20-25 mg per tablet; Take 1 Tab by mouth daily for 90 days. Follow-up and Dispositions    · Return in about 3 months (around 8/13/2020) for HTN, labs . Subjective:   Viirdiana Canela is a 47 y.o. female who was seen for Hypertension and Medication Refill  Patient former patient of Dr. Simone Horner and would like to Establish care today and for medication refills. Patient recently urgent care in [place] or b for the flu. But reports she is better. She is currently working. Hypertension    The history is provided by the patient. This is a chronic problem. The current episode started more than 1 week ago. The problem has not changed since onset. Pertinent negatives include no chest pain, no palpitations, no malaise/fatigue, no blurred vision, no headaches, no dizziness and no shortness of breath. There are no associated agents to hypertension. Risk factors include obesity and hypertension. Medication Refill   The history is provided by the patient. This is a new problem.  Pertinent negatives include no chest pain, no headaches and no shortness of breath. Treatments tried: see med list        Prior to Admission medications    Medication Sig Start Date End Date Taking? Authorizing Provider   lisinopril-hydroCHLOROthiazide (PRINZIDE, ZESTORETIC) 20-25 mg per tablet Take 1 Tab by mouth daily for 90 days. 5/13/20 8/11/20 Yes Asim Jang, NISHA   lisinopril-hydroCHLOROthiazide (PRINZIDE, ZESTORETIC) 20-25 mg per tablet TAKE 2TABLET BY MOUTH EVERY DAY 8/15/19 5/13/20  Maribel Mathias MD   potassium chloride (KLOR-CON) 20 mEq pack Take 0.5 Packets by mouth daily. 8/15/19 5/13/20  Amy Mathias MD   ALPRAZolam [facility]) 0.25 mg tablet Take 1 Tab by mouth nightly as needed for Anxiety. Max Daily Amount: 0.25 mg. 6/13/19 5/13/20  Anthony Mathias MD   ibuprofen (MOTRIN) 600 mg tablet Take 1 Tab by mouth every six (6) hours as needed for Pain. 2/25/18 5/13/20  Dipika Jarrett MD     No Known Allergies    Past Medical History:   Diagnosis Date    Hypertension        Review of Systems   Constitutional: Negative for chills, fever and malaise/fatigue. Eyes: Negative for blurred vision and double vision. Respiratory: Negative for cough, shortness of breath and wheezing. Cardiovascular: Negative for chest pain, palpitations and leg swelling. Musculoskeletal: Negative for myalgias. Neurological: Negative for dizziness and headaches. Objective:   Vital Signs: (As obtained by patient/caregiver at home)  There were no vitals taken for this visit.      [INSTRUCTIONS:  \"[x]\" Indicates a positive item  \"[]\" Indicates a negative item  -- DELETE ALL ITEMS NOT EXAMINED]    Constitutional: [x] Appears well-developed and well-nourished [x] No apparent distress      [] Abnormal -     Mental status: [x] Alert and awake  [x] Oriented to person/place/time [x] Able to follow commands    [] Abnormal -     Eyes:   EOM    [x]  Normal    [] Abnormal -   Sclera  [x]  Normal    [] Abnormal -          Discharge [x] None visible   [] Abnormal -     HENT: [x] Normocephalic, atraumatic  [] Abnormal -   [x] Mouth/Throat: Mucous membranes are moist    External Ears [x] Normal  [] Abnormal -    Neck: [] No visualized mass [] Abnormal - thyroid noted by patient reports no change in size and biopsy reported benign per patient     Pulmonary/Chest: [x] Respiratory effort normal   [x] No visualized signs of difficulty breathing or respiratory distress        [] Abnormal -     Neurological:        [x] No Facial Asymmetry (Cranial nerve 7 motor function) (limited exam due to video visit)          [x] No gaze palsy        [] Abnormal -          Skin:        [x] No significant exanthematous lesions or discoloration noted on facial skin         [] Abnormal -            Psychiatric:       [x] Normal Affect [] Abnormal -        [x] No Hallucinations    Other pertinent observable physical exam findings:-        We discussed the expected course, resolution and complications of the diagnosis(es) in detail. Medication risks, benefits, costs, interactions, and alternatives were discussed as indicated. I advised her to contact the office if her condition worsens, changes or fails to improve as anticipated. She expressed understanding with the diagnosis(es) and plan. Gi Dallas is a 47 y.o. female who was evaluated by a video visit encounter for concerns as above. Patient identification was verified prior to start of the visit. A caregiver was present when appropriate. Due to this being a TeleHealth encounter (During IHADJ-84 public health emergency), evaluation of the following organ systems was limited: Vitals/Constitutional/EENT/Resp/CV/GI//MS/Neuro/Skin/Heme-Lymph-Imm.   Pursuant to the emergency declaration under the [facility], 1135 waiver authority and the [employer] Act, this Virtual  Visit was conducted, with patient's (and/or legal guardian's) consent, to reduce the patient's risk of exposure to COVID-19 and provide necessary medical care. Services were provided through a video synchronous discussion virtually to substitute for in-person clinic visit. Patient and provider were located at their individual homes.       Lamar Segovia NP

## 2020-08-25 DIAGNOSIS — Z76.0 MEDICATION REFILL: ICD-10-CM

## 2020-08-25 DIAGNOSIS — I10 HYPERTENSION, UNSPECIFIED TYPE: ICD-10-CM

## 2020-08-25 NOTE — TELEPHONE ENCOUNTER
[id number] fax request for a 90 day fill per Ins, last OV 5/13/20, no future appts scheduled.     Requested Prescriptions     Pending Prescriptions Disp Refills    lisinopril-hydroCHLOROthiazide (PRINZIDE, ZESTORETIC) 20-25 mg per tablet 30 Tab 0

## 2020-08-26 RX ORDER — LISINOPRIL AND HYDROCHLOROTHIAZIDE 20; 25 MG/1; MG/1
TABLET ORAL
Qty: 90 TAB | Refills: 0 | Status: SHIPPED | OUTPATIENT
Start: 2020-08-26

## 2023-12-28 NOTE — TELEPHONE ENCOUNTER
Adderall 30 mg 1 x day qty 90  [place]      Leaving town in 3 days needs filled asap please    Called pt 2 pt identifiers confirmed informed pt of results of most recent labs and of needle biopsy pt stated understanding, no other questions or concerns noted at this time.